# Patient Record
Sex: FEMALE | Race: WHITE | Employment: UNEMPLOYED | URBAN - METROPOLITAN AREA
[De-identification: names, ages, dates, MRNs, and addresses within clinical notes are randomized per-mention and may not be internally consistent; named-entity substitution may affect disease eponyms.]

---

## 2020-06-18 ENCOUNTER — APPOINTMENT (OUTPATIENT)
Dept: NUCLEAR MEDICINE | Age: 80
DRG: 381 | End: 2020-06-18
Attending: EMERGENCY MEDICINE
Payer: MEDICARE

## 2020-06-18 ENCOUNTER — APPOINTMENT (OUTPATIENT)
Dept: CT IMAGING | Age: 80
DRG: 381 | End: 2020-06-18
Attending: EMERGENCY MEDICINE
Payer: MEDICARE

## 2020-06-18 ENCOUNTER — HOSPITAL ENCOUNTER (INPATIENT)
Age: 80
LOS: 2 days | Discharge: HOME | DRG: 381 | End: 2020-06-20
Attending: EMERGENCY MEDICINE | Admitting: FAMILY MEDICINE
Payer: MEDICARE

## 2020-06-18 DIAGNOSIS — K92.2 LOWER GI BLEED: Primary | ICD-10-CM

## 2020-06-18 LAB
ABO + RH BLD: NORMAL
ALBUMIN SERPL-MCNC: 3.2 G/DL (ref 3.4–5)
ALBUMIN/GLOB SERPL: 1 {RATIO} (ref 0.8–1.7)
ALP SERPL-CCNC: 74 U/L (ref 45–117)
ALT SERPL-CCNC: 24 U/L (ref 13–56)
AMORPH CRY URNS QL MICRO: ABNORMAL
ANION GAP SERPL CALC-SCNC: 10 MMOL/L (ref 3–18)
APPEARANCE UR: ABNORMAL
AST SERPL-CCNC: 24 U/L (ref 10–38)
BACTERIA URNS QL MICRO: ABNORMAL /HPF
BASOPHILS # BLD: 0 K/UL (ref 0–0.1)
BASOPHILS NFR BLD: 0 % (ref 0–2)
BILIRUB SERPL-MCNC: 0.4 MG/DL (ref 0.2–1)
BILIRUB UR QL: NEGATIVE
BLOOD GROUP ANTIBODIES SERPL: NORMAL
BUN SERPL-MCNC: 19 MG/DL (ref 7–18)
BUN/CREAT SERPL: 23 (ref 12–20)
CALCIUM SERPL-MCNC: 8.7 MG/DL (ref 8.5–10.1)
CHLORIDE SERPL-SCNC: 107 MMOL/L (ref 100–111)
CO2 SERPL-SCNC: 23 MMOL/L (ref 21–32)
COLOR UR: YELLOW
CREAT SERPL-MCNC: 0.83 MG/DL (ref 0.6–1.3)
DIFFERENTIAL METHOD BLD: ABNORMAL
EOSINOPHIL # BLD: 0.2 K/UL (ref 0–0.4)
EOSINOPHIL NFR BLD: 2 % (ref 0–5)
EPITH CASTS URNS QL MICRO: ABNORMAL /LPF (ref 0–5)
ERYTHROCYTE [DISTWIDTH] IN BLOOD BY AUTOMATED COUNT: 13.1 % (ref 11.6–14.5)
ERYTHROCYTE [DISTWIDTH] IN BLOOD BY AUTOMATED COUNT: 13.2 % (ref 11.6–14.5)
GLOBULIN SER CALC-MCNC: 3.2 G/DL (ref 2–4)
GLUCOSE SERPL-MCNC: 149 MG/DL (ref 74–99)
GLUCOSE UR STRIP.AUTO-MCNC: NEGATIVE MG/DL
HCT VFR BLD AUTO: 30.9 % (ref 35–45)
HCT VFR BLD AUTO: 32.3 % (ref 35–45)
HGB BLD-MCNC: 10.2 G/DL (ref 12–16)
HGB BLD-MCNC: 10.3 G/DL (ref 12–16)
HGB UR QL STRIP: ABNORMAL
KETONES UR QL STRIP.AUTO: NEGATIVE MG/DL
LEUKOCYTE ESTERASE UR QL STRIP.AUTO: NEGATIVE
LIPASE SERPL-CCNC: 258 U/L (ref 73–393)
LYMPHOCYTES # BLD: 3.5 K/UL (ref 0.9–3.6)
LYMPHOCYTES NFR BLD: 49 % (ref 21–52)
MAGNESIUM SERPL-MCNC: 2.2 MG/DL (ref 1.6–2.6)
MCH RBC QN AUTO: 27.8 PG (ref 24–34)
MCH RBC QN AUTO: 28.6 PG (ref 24–34)
MCHC RBC AUTO-ENTMCNC: 31.9 G/DL (ref 31–37)
MCHC RBC AUTO-ENTMCNC: 33 G/DL (ref 31–37)
MCV RBC AUTO: 86.6 FL (ref 74–97)
MCV RBC AUTO: 87.1 FL (ref 74–97)
MONOCYTES # BLD: 0.4 K/UL (ref 0.05–1.2)
MONOCYTES NFR BLD: 6 % (ref 3–10)
NEUTS SEG # BLD: 3.1 K/UL (ref 1.8–8)
NEUTS SEG NFR BLD: 43 % (ref 40–73)
NITRITE UR QL STRIP.AUTO: NEGATIVE
PH UR STRIP: >8.5 [PH] (ref 5–8)
PLATELET # BLD AUTO: 158 K/UL (ref 135–420)
PLATELET # BLD AUTO: 239 K/UL (ref 135–420)
PMV BLD AUTO: 10.3 FL (ref 9.2–11.8)
PMV BLD AUTO: 9.3 FL (ref 9.2–11.8)
POTASSIUM SERPL-SCNC: 4 MMOL/L (ref 3.5–5.5)
PROT SERPL-MCNC: 6.4 G/DL (ref 6.4–8.2)
PROT UR STRIP-MCNC: NEGATIVE MG/DL
RBC # BLD AUTO: 3.57 M/UL (ref 4.2–5.3)
RBC # BLD AUTO: 3.71 M/UL (ref 4.2–5.3)
RBC #/AREA URNS HPF: ABNORMAL /HPF (ref 0–5)
RETICS/RBC NFR AUTO: 1.8 % (ref 0.5–2.3)
SODIUM SERPL-SCNC: 140 MMOL/L (ref 136–145)
SP GR UR REFRACTOMETRY: 1.02 (ref 1–1.03)
SPECIMEN EXP DATE BLD: NORMAL
UROBILINOGEN UR QL STRIP.AUTO: 0.2 EU/DL (ref 0.2–1)
WBC # BLD AUTO: 7.2 K/UL (ref 4.6–13.2)
WBC # BLD AUTO: 8.9 K/UL (ref 4.6–13.2)
WBC URNS QL MICRO: ABNORMAL /HPF (ref 0–5)

## 2020-06-18 PROCEDURE — 81001 URINALYSIS AUTO W/SCOPE: CPT

## 2020-06-18 PROCEDURE — 74011636320 HC RX REV CODE- 636/320: Performed by: EMERGENCY MEDICINE

## 2020-06-18 PROCEDURE — 65270000029 HC RM PRIVATE

## 2020-06-18 PROCEDURE — C9113 INJ PANTOPRAZOLE SODIUM, VIA: HCPCS | Performed by: FAMILY MEDICINE

## 2020-06-18 PROCEDURE — 74011000250 HC RX REV CODE- 250: Performed by: FAMILY MEDICINE

## 2020-06-18 PROCEDURE — 74011250636 HC RX REV CODE- 250/636: Performed by: EMERGENCY MEDICINE

## 2020-06-18 PROCEDURE — 85025 COMPLETE CBC W/AUTO DIFF WBC: CPT

## 2020-06-18 PROCEDURE — 85045 AUTOMATED RETICULOCYTE COUNT: CPT

## 2020-06-18 PROCEDURE — 86900 BLOOD TYPING SEROLOGIC ABO: CPT

## 2020-06-18 PROCEDURE — 74174 CTA ABD&PLVS W/CONTRAST: CPT

## 2020-06-18 PROCEDURE — 80053 COMPREHEN METABOLIC PANEL: CPT

## 2020-06-18 PROCEDURE — 74011250636 HC RX REV CODE- 250/636: Performed by: FAMILY MEDICINE

## 2020-06-18 PROCEDURE — 99285 EMERGENCY DEPT VISIT HI MDM: CPT

## 2020-06-18 PROCEDURE — 85027 COMPLETE CBC AUTOMATED: CPT

## 2020-06-18 PROCEDURE — 96374 THER/PROPH/DIAG INJ IV PUSH: CPT

## 2020-06-18 PROCEDURE — 86677 HELICOBACTER PYLORI ANTIBODY: CPT

## 2020-06-18 PROCEDURE — 83735 ASSAY OF MAGNESIUM: CPT

## 2020-06-18 PROCEDURE — 83690 ASSAY OF LIPASE: CPT

## 2020-06-18 RX ORDER — FLUMAZENIL 0.1 MG/ML
0.2 INJECTION INTRAVENOUS
Status: CANCELLED | OUTPATIENT
Start: 2020-06-18 | End: 2020-06-18

## 2020-06-18 RX ORDER — SODIUM CHLORIDE 0.9 % (FLUSH) 0.9 %
5-40 SYRINGE (ML) INJECTION EVERY 8 HOURS
Status: CANCELLED | OUTPATIENT
Start: 2020-06-18

## 2020-06-18 RX ORDER — MELATONIN
2000 DAILY
COMMUNITY

## 2020-06-18 RX ORDER — ATROPINE SULFATE 0.1 MG/ML
0.5 INJECTION INTRAVENOUS
Status: CANCELLED | OUTPATIENT
Start: 2020-06-18 | End: 2020-06-19

## 2020-06-18 RX ORDER — SODIUM CHLORIDE 0.9 % (FLUSH) 0.9 %
5-40 SYRINGE (ML) INJECTION AS NEEDED
Status: CANCELLED | OUTPATIENT
Start: 2020-06-18

## 2020-06-18 RX ORDER — DEXTROMETHORPHAN/PSEUDOEPHED 2.5-7.5/.8
1.2 DROPS ORAL
Status: CANCELLED | OUTPATIENT
Start: 2020-06-18

## 2020-06-18 RX ORDER — CALCIUM CARBONATE 200(500)MG
2 TABLET,CHEWABLE ORAL AS NEEDED
COMMUNITY

## 2020-06-18 RX ORDER — SODIUM CHLORIDE 0.9 % (FLUSH) 0.9 %
5-40 SYRINGE (ML) INJECTION AS NEEDED
Status: DISCONTINUED | OUTPATIENT
Start: 2020-06-18 | End: 2020-06-20 | Stop reason: HOSPADM

## 2020-06-18 RX ORDER — IBUPROFEN 200 MG
400 TABLET ORAL
COMMUNITY
End: 2020-06-20

## 2020-06-18 RX ORDER — ONDANSETRON 2 MG/ML
4 INJECTION INTRAMUSCULAR; INTRAVENOUS
Status: DISCONTINUED | OUTPATIENT
Start: 2020-06-18 | End: 2020-06-20 | Stop reason: HOSPADM

## 2020-06-18 RX ORDER — HEPARIN SODIUM (PORCINE) LOCK FLUSH IV SOLN 100 UNIT/ML 100 UNIT/ML
10 SOLUTION INTRAVENOUS
Status: COMPLETED | OUTPATIENT
Start: 2020-06-18 | End: 2020-06-18

## 2020-06-18 RX ORDER — EPINEPHRINE 0.1 MG/ML
1 INJECTION INTRACARDIAC; INTRAVENOUS
Status: CANCELLED | OUTPATIENT
Start: 2020-06-18 | End: 2020-06-19

## 2020-06-18 RX ORDER — ONDANSETRON 2 MG/ML
4 INJECTION INTRAMUSCULAR; INTRAVENOUS
Status: COMPLETED | OUTPATIENT
Start: 2020-06-18 | End: 2020-06-18

## 2020-06-18 RX ORDER — SODIUM CHLORIDE 9 MG/ML
1000 INJECTION, SOLUTION INTRAVENOUS CONTINUOUS
Status: CANCELLED | OUTPATIENT
Start: 2020-06-18

## 2020-06-18 RX ORDER — LANOLIN ALCOHOL/MO/W.PET/CERES
400 CREAM (GRAM) TOPICAL 2 TIMES WEEKLY
COMMUNITY

## 2020-06-18 RX ORDER — DIPHENHYDRAMINE HYDROCHLORIDE 50 MG/ML
50 INJECTION, SOLUTION INTRAMUSCULAR; INTRAVENOUS ONCE
Status: CANCELLED | OUTPATIENT
Start: 2020-06-18 | End: 2020-06-18

## 2020-06-18 RX ORDER — SODIUM CHLORIDE 0.9 % (FLUSH) 0.9 %
5-40 SYRINGE (ML) INJECTION EVERY 8 HOURS
Status: DISCONTINUED | OUTPATIENT
Start: 2020-06-18 | End: 2020-06-20 | Stop reason: HOSPADM

## 2020-06-18 RX ADMIN — HEPARIN SODIUM (PORCINE) LOCK FLUSH IV SOLN 100 UNIT/ML 10 UNITS: 100 SOLUTION at 14:15

## 2020-06-18 RX ADMIN — ONDANSETRON 4 MG: 2 INJECTION INTRAMUSCULAR; INTRAVENOUS at 08:09

## 2020-06-18 RX ADMIN — Medication 5 ML: at 22:00

## 2020-06-18 RX ADMIN — SODIUM CHLORIDE 1000 ML: 900 INJECTION, SOLUTION INTRAVENOUS at 08:09

## 2020-06-18 RX ADMIN — IOPAMIDOL 100 ML: 755 INJECTION, SOLUTION INTRAVENOUS at 09:04

## 2020-06-18 RX ADMIN — SODIUM CHLORIDE 40 MG: 9 INJECTION, SOLUTION INTRAMUSCULAR; INTRAVENOUS; SUBCUTANEOUS at 18:04

## 2020-06-18 NOTE — H&P
History & Physical    Patient: Ceasar Donato MRN: [id number]  CSN: [id number]    YOB: 1940  Age: 70 y.o. Sex: female      DOA: 6/18/2020  Primary Care Provider:  None      Assessment/Plan   69 y/o female with h/o inguinal hernia repair documented to be done by Dr. Dmitriy Hernandez. Admitted for Lower GI Bleed. Acute Lower GI Bleed   -admit to telemetry   -presenting Hgb 10.3  -Type and Cross, transfusion not necessary yet  -Dr. Lakesha Bass consulted by ED: Nuclear Med Bleeding Scan, clear liquid diet   -rehydration fluid   -avoid AC meds   -IV Protonix     DVT Prophylaxis: SCDs   GI Prophylaxis: covered by protonix     Estimated length of stay : 2-3 days     CC: BRBPR        HPI:     Ceasar Donato is a 70 y.o. female who Ceasar Donato is a 70 y.o. female with PMHX of inguinal hernia repair by Dr. Dmitriy Hernandez who presents to the emergency department C/O diarrhea.     Patient says she has been in the state of her normal health however woke up this morning with crampy abdominal pain and had episode of diarrhea. She was feeling lightheaded and faint but did not pass out. She called EMS and says that she noticed a bright red spot on the bedding when she was transferred to the stretcher. She denies abdominal pain. No sick contacts. No past medical history on file.     Past Surgical History:   Procedure Laterality Date    HX GI         Family history:     Heart Disease Brother       Arthritis-rheumatoid Father       Heart Disease Father       Stroke Father       Lung Cancer Mother       Ovarian Cancer Mother       Cancer Sister       Diabetes Sister       Heart Attack Sister             Social History     Socioeconomic History    Marital status:      Spouse name: Not on file    Number of children: Not on file    Years of education: Not on file    Highest education level: Not on file   Tobacco Use    Smoking status: Never Smoker   Substance and Sexual Activity    Alcohol use: Never Frequency: Never    Drug use: Never    Sexual activity: Never       Prior to Admission medications    Not on File       No Known Allergies    Review of Systems  Gen: No fever, chills, malaise, weight loss/gain. Heent: No headache, rhinorrhea, epistaxis, ear pain, hearing loss, sinus pain, neck pain/stiffness, sore throat. Heart: No chest pain, palpitations, BORDEN, pnd, or orthopnea. Resp: No cough, hemoptysis, wheezing and shortness of breath. GI: No nausea, vomiting, diarrhea, constipation, +BRBPR   : No urinary obstruction, dysuria or hematuria. Derm: No rash, new skin lesion or pruritis. Musc/skeletal: no bone or joint complains. Vasc: No edema, cyanosis or claudication. Endo: No heat/cold intolerance, no polyuria,polydipsia or polyphagia. Neuro: No unilateral weakness, numbness, tingling. No seizures. Heme: No easy bruising or bleeding. Physical Exam:     Physical Exam:  Visit Vitals  /59   Pulse 68   Temp 97.6 °F (36.4 °C)   Resp 22   Ht 5' 5\" (1.651 m)   Wt 74.8 kg (165 lb)   SpO2 100%   BMI 27.46 kg/m²      O2 Device: Room air    Temp (24hrs), Av.6 °F (36.4 °C), Min:97.6 °F (36.4 °C), Max:97.6 °F (36.4 °C)     0701 -  1900  In: 1000 [I.V.:1000]  Out: -    No intake/output data recorded. General:  Awake, cooperative, no distress. Head:  Normocephalic, without obvious abnormality, atraumatic. Eyes:  Conjunctivae/corneas clear, sclera anicteric, PERRL, EOMs intact. Nose: Nares normal. No drainage or sinus tenderness. Throat: Lips, mucosa, and tongue normal.    Neck: Supple, symmetrical, trachea midline, no adenopathy. Lungs:   Clear to auscultation bilaterally. Heart:  Regular rate and rhythm, S1, S2 normal, no murmur, click, rub or gallop. Abdomen: Soft, non-tender. Bowel sounds normal. No masses,  No organomegaly. Extremities: Extremities normal, atraumatic, no cyanosis or edema.  Capillary refill normal.   Pulses: 2+ and symmetric all extremities. Skin: Skin color pink, turgor normal. No rashes or lesions   Neurologic: CNII-XII intact. No focal motor or sensory deficit. Labs Reviewed:  Recent Results (from the past 24 hour(s))   CBC WITH AUTOMATED DIFF    Collection Time: 06/18/20  7:50 AM   Result Value Ref Range    WBC 7.2 4.6 - 13.2 K/uL    RBC 3.71 (L) 4.20 - 5.30 M/uL    HGB 10.3 (L) 12.0 - 16.0 g/dL    HCT 32.3 (L) 35.0 - 45.0 %    MCV 87.1 74.0 - 97.0 FL    MCH 27.8 24.0 - 34.0 PG    MCHC 31.9 31.0 - 37.0 g/dL    RDW 13.1 11.6 - 14.5 %    PLATELET 384 922 - 651 K/uL    MPV 9.3 9.2 - 11.8 FL    NEUTROPHILS 43 40 - 73 %    LYMPHOCYTES 49 21 - 52 %    MONOCYTES 6 3 - 10 %    EOSINOPHILS 2 0 - 5 %    BASOPHILS 0 0 - 2 %    ABS. NEUTROPHILS 3.1 1.8 - 8.0 K/UL    ABS. LYMPHOCYTES 3.5 0.9 - 3.6 K/UL    ABS. MONOCYTES 0.4 0.05 - 1.2 K/UL    ABS. EOSINOPHILS 0.2 0.0 - 0.4 K/UL    ABS. BASOPHILS 0.0 0.0 - 0.1 K/UL    DF AUTOMATED     METABOLIC PANEL, COMPREHENSIVE    Collection Time: 06/18/20  7:50 AM   Result Value Ref Range    Sodium 140 136 - 145 mmol/L    Potassium 4.0 3.5 - 5.5 mmol/L    Chloride 107 100 - 111 mmol/L    CO2 23 21 - 32 mmol/L    Anion gap 10 3.0 - 18 mmol/L    Glucose 149 (H) 74 - 99 mg/dL    BUN 19 (H) 7.0 - 18 MG/DL    Creatinine 0.83 0.6 - 1.3 MG/DL    BUN/Creatinine ratio 23 (H) 12 - 20      GFR est AA >60 >60 ml/min/1.73m2    GFR est non-AA >60 >60 ml/min/1.73m2    Calcium 8.7 8.5 - 10.1 MG/DL    Bilirubin, total 0.4 0.2 - 1.0 MG/DL    ALT (SGPT) 24 13 - 56 U/L    AST (SGOT) 24 10 - 38 U/L    Alk.  phosphatase 74 45 - 117 U/L    Protein, total 6.4 6.4 - 8.2 g/dL    Albumin 3.2 (L) 3.4 - 5.0 g/dL    Globulin 3.2 2.0 - 4.0 g/dL    A-G Ratio 1.0 0.8 - 1.7     LIPASE    Collection Time: 06/18/20  7:50 AM   Result Value Ref Range    Lipase 258 73 - 393 U/L   MAGNESIUM    Collection Time: 06/18/20  7:50 AM   Result Value Ref Range    Magnesium 2.2 1.6 - 2.6 mg/dL   TYPE & SCREEN    Collection Time: 06/18/20  7:50 AM Result Value Ref Range    Crossmatch Expiration 06/21/2020     ABO/Rh(D) A POSITIVE     Antibody screen NEG    URINALYSIS W/ RFLX MICROSCOPIC    Collection Time: 06/18/20  8:49 AM   Result Value Ref Range    Color YELLOW      Appearance TURBID      Specific gravity 1.016 1.005 - 1.030      pH (UA) >8.5 (H) 5.0 - 8.0    Protein Negative NEG mg/dL    Glucose Negative NEG mg/dL    Ketone Negative NEG mg/dL    Bilirubin Negative NEG      Blood SMALL (A) NEG      Urobilinogen 0.2 0.2 - 1.0 EU/dL    Nitrites Negative NEG      Leukocyte Esterase Negative NEG     URINE MICROSCOPIC ONLY    Collection Time: 06/18/20  8:49 AM   Result Value Ref Range    WBC NONE 0 - 5 /hpf    RBC 0 to 3 0 - 5 /hpf    Epithelial cells 1+ 0 - 5 /lpf    Bacteria 1+ (A) NEG /hpf    Amorphous Crystals 3+ (A) NEG       Procedures/imaging: see electronic medical records for all procedures/Xrays and details which were not copied into this note but were reviewed prior to creation of Plan    CC: None

## 2020-06-18 NOTE — ED TRIAGE NOTES
Patient arrives via EMS c/o uncontrolled nausea and diarrhea since this AM. Per EMS, bright red rectal bleeding. No PMH. AOx4.

## 2020-06-18 NOTE — PROGRESS NOTES
ADMISSION MEDICATION RECONCILIATION      Medication Reconciliation has been performed by pharmacist on this patient admitted through the ED today. Matt Ngo is a 70 y.o. female with the following Problems:  Assessment This Admission:   Chief complaint:   Chief Complaint   Patient presents with    Rectal Bleeding      Active Problems:    Lower GI bleed (6/18/2020)         Allergies as of 06/18/2020    (No Known Allergies)    denies  Prior to Admission Medications   Prescriptions Last Dose Informant Patient Reported? Taking?   calcium carbonate (HÉCTOR-SELTZER ANTACID PO) Unknown at Unknown time  Yes No   Sig: Take  by mouth as needed. calcium carbonate (TUMS) 200 mg calcium (500 mg) chew Unknown at Unknown time  Yes No   Sig: Take 2 Tabs by mouth as needed. Indications: indigestion, heartburn   cholecalciferol (Vitamin D3) (1000 Units /25 mcg) tablet 6/18/2020 at Unknown time  Yes Yes   Sig: Take 2,000 Units by mouth daily. ibuprofen (MOTRIN) 200 mg tablet Unknown at Unknown time  Yes No   Sig: Take 400 mg by mouth every eight (8) hours as needed for Pain.   magnesium oxide (MAG-OX) 400 mg tablet 6/17/2020 at Unknown time  Yes Yes   Sig: Take 400 mg by mouth two (2) times a week. multivitamin, tx-iron-ca-min (THERA-M w/ IRON) 9 mg iron-400 mcg tab tablet 6/18/2020 at Unknown time  Yes Yes   Sig: Take 1 Tab by mouth daily. Formulary substitution for DAILY MULTIVITAMIN  With minerals and enzymes   peg 400/hypromellose/glycerin (VISINE DRY EYE RELIEF OP) Unknown at Unknown time  Yes No   Sig: Apply  to eye as needed. Facility-Administered Medications: None          Interviewed patient in ED. This person was a Fair historian. Did patient/representative provide a written list of home medications? no  Medications are managed by: self  Added/updated outpatient pharmacy to include: [facility]     PAML was not  marked complete and did require modification.       Medication Compliance Issues and/or Medication Concerns: none        Added to PAML: all of the above    Attending physician or representative has not been contacted as either PAML modifications not deemed clinically significant; or modified medication has been reviewed with status of \"Do not order for admission\"; or admission orders have yet to be written.     Sofie Massey [facility], April Vega    Clinical Pharmacist  [phone]

## 2020-06-18 NOTE — ED NOTES
Lab notified of need for labs. Patient is a ed hold at this time. Lab and pharmacy and dietary made aware to deliver clear liquid diet to patient.

## 2020-06-18 NOTE — ED NOTES
TRANSFER - OUT REPORT:    Verbal report given to [facility]) on [facility]  being transferred to 306(unit) for routine progression of care       Report consisted of patients Situation, Background, Assessment and   Recommendations(SBAR). Information from the following report(s) ED Summary was reviewed with the receiving nurse. Lines:   Peripheral IV 06/18/20 Left Antecubital (Active)   Site Assessment Clean, dry, & intact 6/18/2020  7:51 AM   Phlebitis Assessment 0 6/18/2020  7:51 AM   Infiltration Assessment 0 6/18/2020  7:51 AM   Dressing Status Clean, dry, & intact 6/18/2020  7:51 AM   Dressing Type Transparent 6/18/2020  7:51 AM        Opportunity for questions and clarification was provided.       Patient transported with:   Registered Nurse

## 2020-06-18 NOTE — PROGRESS NOTES
Possibility of acute UGI bleeding that started at 6:30 am with presyncopal episode and nausea. Bright blood mixed with stool couldn't tell how much but later black with dark blood. CT scan medium sized internal hernia. Acute GI bleeding scan normal VS stable. Hgb remained stable at 10.2 even after hydration. She has been c/o daily dyspepsia and frequent nausea for one year treated with tums and Alkaselzer. She has been taking Ibuprofen every 3 to 4 days for headaches. I think we need to consider first UGI bleeding do tomorrow afternoon an EGD and colonoscopy. So she has to be prepped.  The Golytely could be started in this case tomorrow at 9 am. Told her not to take any NSAID's in the future

## 2020-06-19 LAB
ANION GAP SERPL CALC-SCNC: 5 MMOL/L (ref 3–18)
BASOPHILS # BLD: 0 K/UL (ref 0–0.1)
BASOPHILS NFR BLD: 0 % (ref 0–2)
BUN SERPL-MCNC: 13 MG/DL (ref 7–18)
BUN/CREAT SERPL: 23 (ref 12–20)
CALCIUM SERPL-MCNC: 8 MG/DL (ref 8.5–10.1)
CHLORIDE SERPL-SCNC: 112 MMOL/L (ref 100–111)
CO2 SERPL-SCNC: 26 MMOL/L (ref 21–32)
CREAT SERPL-MCNC: 0.57 MG/DL (ref 0.6–1.3)
DIFFERENTIAL METHOD BLD: ABNORMAL
EOSINOPHIL # BLD: 0.1 K/UL (ref 0–0.4)
EOSINOPHIL NFR BLD: 1 % (ref 0–5)
ERYTHROCYTE [DISTWIDTH] IN BLOOD BY AUTOMATED COUNT: 13.4 % (ref 11.6–14.5)
GLUCOSE SERPL-MCNC: 107 MG/DL (ref 74–99)
HCT VFR BLD AUTO: 28.4 % (ref 35–45)
HGB BLD-MCNC: 9 G/DL (ref 12–16)
IRON SATN MFR SERPL: 12 % (ref 20–50)
IRON SERPL-MCNC: 39 UG/DL (ref 50–175)
LYMPHOCYTES # BLD: 2.5 K/UL (ref 0.9–3.6)
LYMPHOCYTES NFR BLD: 35 % (ref 21–52)
MCH RBC QN AUTO: 28.2 PG (ref 24–34)
MCHC RBC AUTO-ENTMCNC: 31.7 G/DL (ref 31–37)
MCV RBC AUTO: 89 FL (ref 74–97)
MONOCYTES # BLD: 0.6 K/UL (ref 0.05–1.2)
MONOCYTES NFR BLD: 8 % (ref 3–10)
NEUTS SEG # BLD: 4 K/UL (ref 1.8–8)
NEUTS SEG NFR BLD: 56 % (ref 40–73)
PLATELET # BLD AUTO: 213 K/UL (ref 135–420)
PMV BLD AUTO: 9.1 FL (ref 9.2–11.8)
POTASSIUM SERPL-SCNC: 3.9 MMOL/L (ref 3.5–5.5)
RBC # BLD AUTO: 3.19 M/UL (ref 4.2–5.3)
SODIUM SERPL-SCNC: 143 MMOL/L (ref 136–145)
TIBC SERPL-MCNC: 325 UG/DL (ref 250–450)
WBC # BLD AUTO: 7.2 K/UL (ref 4.6–13.2)

## 2020-06-19 PROCEDURE — 88305 TISSUE EXAM BY PATHOLOGIST: CPT

## 2020-06-19 PROCEDURE — 83540 ASSAY OF IRON: CPT

## 2020-06-19 PROCEDURE — 36415 COLL VENOUS BLD VENIPUNCTURE: CPT

## 2020-06-19 PROCEDURE — 65270000029 HC RM PRIVATE

## 2020-06-19 PROCEDURE — 99153 MOD SED SAME PHYS/QHP EA: CPT | Performed by: INTERNAL MEDICINE

## 2020-06-19 PROCEDURE — 74011000250 HC RX REV CODE- 250: Performed by: INTERNAL MEDICINE

## 2020-06-19 PROCEDURE — 0DBL8ZX EXCISION OF TRANSVERSE COLON, VIA NATURAL OR ARTIFICIAL OPENING ENDOSCOPIC, DIAGNOSTIC: ICD-10-PCS | Performed by: INTERNAL MEDICINE

## 2020-06-19 PROCEDURE — 74011000250 HC RX REV CODE- 250: Performed by: FAMILY MEDICINE

## 2020-06-19 PROCEDURE — 77030013991 HC SNR POLYP ENDOSC BSC -A: Performed by: INTERNAL MEDICINE

## 2020-06-19 PROCEDURE — G0500 MOD SEDAT ENDO SERVICE >5YRS: HCPCS | Performed by: INTERNAL MEDICINE

## 2020-06-19 PROCEDURE — 85025 COMPLETE CBC W/AUTO DIFF WBC: CPT

## 2020-06-19 PROCEDURE — 74011250636 HC RX REV CODE- 250/636: Performed by: FAMILY MEDICINE

## 2020-06-19 PROCEDURE — 74011250636 HC RX REV CODE- 250/636: Performed by: INTERNAL MEDICINE

## 2020-06-19 PROCEDURE — 0DBH8ZX EXCISION OF CECUM, VIA NATURAL OR ARTIFICIAL OPENING ENDOSCOPIC, DIAGNOSTIC: ICD-10-PCS | Performed by: INTERNAL MEDICINE

## 2020-06-19 PROCEDURE — 77030040361 HC SLV COMPR DVT MDII -B: Performed by: INTERNAL MEDICINE

## 2020-06-19 PROCEDURE — 76040000009: Performed by: INTERNAL MEDICINE

## 2020-06-19 PROCEDURE — 0DJ08ZZ INSPECTION OF UPPER INTESTINAL TRACT, VIA NATURAL OR ARTIFICIAL OPENING ENDOSCOPIC: ICD-10-PCS | Performed by: INTERNAL MEDICINE

## 2020-06-19 PROCEDURE — 80048 BASIC METABOLIC PNL TOTAL CA: CPT

## 2020-06-19 PROCEDURE — C9113 INJ PANTOPRAZOLE SODIUM, VIA: HCPCS | Performed by: FAMILY MEDICINE

## 2020-06-19 RX ORDER — SODIUM CHLORIDE 9 MG/ML
1000 INJECTION, SOLUTION INTRAVENOUS CONTINUOUS
Status: DISPENSED | OUTPATIENT
Start: 2020-06-19 | End: 2020-06-19

## 2020-06-19 RX ORDER — NALOXONE HYDROCHLORIDE 0.4 MG/ML
0.4 INJECTION, SOLUTION INTRAMUSCULAR; INTRAVENOUS; SUBCUTANEOUS
Status: DISCONTINUED | OUTPATIENT
Start: 2020-06-19 | End: 2020-06-19 | Stop reason: HOSPADM

## 2020-06-19 RX ORDER — MIDAZOLAM HYDROCHLORIDE 1 MG/ML
.25-5 INJECTION, SOLUTION INTRAMUSCULAR; INTRAVENOUS
Status: DISCONTINUED | OUTPATIENT
Start: 2020-06-19 | End: 2020-06-19 | Stop reason: HOSPADM

## 2020-06-19 RX ORDER — FENTANYL CITRATE 50 UG/ML
100 INJECTION, SOLUTION INTRAMUSCULAR; INTRAVENOUS
Status: DISCONTINUED | OUTPATIENT
Start: 2020-06-19 | End: 2020-06-19 | Stop reason: HOSPADM

## 2020-06-19 RX ORDER — SODIUM CHLORIDE 9 MG/ML
125 INJECTION, SOLUTION INTRAVENOUS CONTINUOUS
Status: CANCELLED | OUTPATIENT
Start: 2020-06-19

## 2020-06-19 RX ADMIN — POLYETHYLENE GLYCOL 3350, SODIUM CHLORIDE, POTASSIUM CHLORIDE, SODIUM BICARBONATE, AND SODIUM SULFATE 4000 ML: 240; 5.84; 2.98; 6.72; 22.72 POWDER, FOR SOLUTION ORAL at 09:00

## 2020-06-19 RX ADMIN — ONDANSETRON 4 MG: 2 INJECTION INTRAMUSCULAR; INTRAVENOUS at 11:23

## 2020-06-19 RX ADMIN — SODIUM CHLORIDE 40 MG: 9 INJECTION, SOLUTION INTRAMUSCULAR; INTRAVENOUS; SUBCUTANEOUS at 18:18

## 2020-06-19 RX ADMIN — SODIUM CHLORIDE 40 MG: 9 INJECTION, SOLUTION INTRAMUSCULAR; INTRAVENOUS; SUBCUTANEOUS at 07:04

## 2020-06-19 RX ADMIN — Medication 10 ML: at 21:17

## 2020-06-19 RX ADMIN — SODIUM CHLORIDE 1000 ML: 900 INJECTION, SOLUTION INTRAVENOUS at 15:30

## 2020-06-19 RX ADMIN — Medication 10 ML: at 07:11

## 2020-06-19 NOTE — CONSULTS
[id number]    Name:  Myrtle Gallegos  MR#:   [id number]  :  1940  ACCOUNT #:  [de-identified]  DATE OF SERVICE:  2020    HISTORY OF PRESENT ILLNESS:  This is a 70years old female who was brought here by ambulance around 7:53 this morning because of rectal bleeding. This patient is not a very good historian and also she is hard of hearing. She claimed that this morning she woke up around 6:30 feeling the urge to go to the bathroom, but shortly after, she felt lightheaded, dizzy, and diaphoretic. She did pass bloody stool and this happened on two other occasions in the hospital, the last one was about 3 o'clock, but this one was much smaller. She claimed that her stools were black and had some red in it. The patient claimed that she was nauseous this morning, but did not vomit. She did not have any abdominal pain. She claimed that, for the last year, she has been complaining of dyspepsia with heartburn, regurgitations, and belching. She was treating this with [place] or with antacids. She has been having intermittent dysphagia to solids and sometimes to soft foods where she had to take her time to chew well. On a few occasions, she was choking. This happened recently one time with peanuts where she felt that she was going to choke and finally it passed after drinking water. Her weight has been stable. Usually, she has one bowel movement everyday. She claimed that she had a colonoscopy about seven years ago, but she is not sure where and by whom, and she claimed that a couple of polyps were removed. She told it was done at this hospital, but I did not find any records of this and also there was nothing in the [facility]. She has no family history of colon cancer. Her mother did have lung and ovarian cancer. Her father had heart disease. She is not a diabetic.   She does not smoke or drink alcohol, does take ibuprofen for headaches and for pain in her groin, almost three to four days, last consumption was about four days ago. She has no prior history of peptic ulcer disease, never had endoscopy, but she claimed that, many years ago, she had an upper GI series that showed that she had a blockage or narrowing in her throat, but never had endoscopy. She does not take any aspirin. She never had any myocardial infarction. She did have Bell's palsy, but no history of stroke. She has a previous right inguinal hernia repair. She lives by herself, but she is quite independent. She works in the yard frequently. MEDICATIONS AT HOME:  1.  Multivitamins. 2.  Vitamin D3. 3.  Motrin 400 mg every 8 hours as needed. 4.  Magnesium oxide. 5.  Calcium carbonate. 6.  [place]. 7.  Antacids. ALLERGIES:  NO KNOWN DRUG ALLERGIES. The patient felt this morning, almost going to pass out, but did not fall or pass out. She claimed that, for the last year, she has been having shortness of breath on exertion, but no chest pain. No dysuria, hematuria, or burning sensation. No back pain or rash. When she arrived, her vital signs were normal with a pulse of 68, blood pressure 128/63, saturation 100%. Her blood test showed that she has a BUN of 19, creatinine 0.83, when they were 15 and 0.9 in 2010, this was a long time ago. LFT's were normal.  Her hemoglobin was 10.3 at 7:50 and it remained at 10.2 at 6:15 p.m. Normal MCV and MCH. Normal WBC and platelets. CT scan of the abdomen and pelvis with and without contrast did not show any active bleeding. There was left colonic diverticulosis, calcified uterine fibroids, multiple simple hepatic cysts, and moderate hiatal hernia. Acute GI bleeding scan was negative. PHYSICAL EXAMINATION:  GENERAL:  We have a 70years old  female who is very hard of hearing and not a very good historian, but she appears to be in no distress.   VITAL SIGNS:  She weighs 165 pounds, temperature 97.6, pulse 66, blood pressure 119-116/, breathing 10-18 with saturation at 99% to 100%. SKIN:  Normal.  No evidence of any rash. HEENT:  The eyes are remarkable for slightly pale conjunctivae, but the sclerae are anicteric. The pupils are equal and reactive to light. Oropharyngeal cavity; she has moist and slightly pale mucous membrane. Normal teeth. NECK:  Supple. No palpable mass. No enlarged thyroid. LUNGS:  Clear to auscultation. HEART:  Cardiac rhythm is regular. S1 and S2 normal.  No murmur. ABDOMEN:  Not distended, very soft, nontender. No mass or organomegaly. Bowel sounds are normal.  EXTREMITIES:  Unremarkable. No pedal edema. No clubbing. No tremor. NEUROLOGIC:  No focal neurological signs. She is alert and oriented. Moves all her four extremities. LABORATORY DATA:  Blood test as stated above. Her WBC, platelets, and differential are normal.  Her LFT's and lipase are normal.    ASSESSMENT:  In conclusion, this is a 70years old female who has been taking ibuprofen on a regular basis for headache and also left groin pain from previous inguinal hernia repair about 10 years ago, has been complaining for the past year of frequent and almost daily dyspepsia in the form of heartburn, regurgitation, nausea, presented with rectal bleeding that appeared to be bright blood, but according to her last bowel movement, it was black with some reddish in it. In my opinion, with her upper gastrointestinal symptoms, we need to check her stomach. I believe that she may have an upper gastrointestinal bleeding, especially that this morning, she felt very orthostatic and almost had a presyncopal episode. However, her vital signs and her hemoglobin held very well at 10.2 even after hydration. We would request Helicobacter serology. She is already on Protonix, we are going to continue it and tomorrow afternoon, I will come to do her upper endoscopy after my clinic elsewhere.   For now, the patient would be allowed to have a clear liquid diet. We may consider doing her colonoscopy as an outpatient. According to the CT scan, she has a medium-sized hiatal hernia. I would not be surprised that she has erosive esophagitis. She does have some dysphagia to solids, which will be addressed during the endoscopy. Most likely, she will need to be on long-term proton-pump inhibitor. She is very hard of hearing. She does have a history of polyp removed about seven years ago, we need to verify her previous records, but not at this location or [facility]. Further notes will follow. Sincerely,        MD RYAN Gold/[email or web address]  D:  06/18/2020 19:38  T:  06/19/2020 0:10  JOB #:  [id number]  CC:  MD Evangelina Mark.  Kris Sharma MD

## 2020-06-19 NOTE — PROGRESS NOTES
Reason for Admission:   Acute Lower GI Bleed                    RUR Score:    6%                 Plan for utilizing home health:  TBD         PCP: First and Last name:     Name of Practice:    Are you a current patient: Yes/No:    Approximate date of last visit:    Can you participate in a virtual visit with your PCP:                     Current Advanced Directive/Advance Care Plan: Not on file                         Transition of Care Plan:   Home with physician follow up vs [facility] and physician follow up                    Chart reviewed. Per H&P \"Chen Miramontes is a 70 y.o. female who Annrussel Barboza a 70 y. o. female with PMHX of inguinal hernia repair by Dr. Kajal Head presents to the emergency department C/O diarrhea.     Patient says she has been in the state of her normal health however woke up this morning with crampy abdominal pain and had episode of diarrhea.  She was feeling lightheaded and faint but did not pass out.  She called EMS and says that she noticed a bright red spot on the bedding when she was transferred to the stretcher.  She denies abdominal pain.  No sick contacts. \"    1400:  CM attempted to meet with pt at bedside x2 and attempted to call x 3. Pt with planned EGD and colonoscopy. Please encourage ambulation or order therapy services to assist with identifying potential transition of care needs.   Anticipate pt will transition home with physician follow up vs [facility] and physician follow up  CM to continue to follow and assist.    Care Management Interventions  Transition of Care Consult (CM Consult): Discharge Planning  Health Maintenance Reviewed: Yes  Current Support Network: Family [employer] [place]

## 2020-06-19 NOTE — PROGRESS NOTES
1951-Bedside and Verbal shift change report given to Mani Brown (oncoming nurse) by Mercedes Montaño (offgoing nurse). Report included the following information SBAR, Kardex, Intake/Output and MAR. Patient A/OX4. Patient in bed resting quietly. 1949-Bedside and Verbal shift change report given to Yodit Chao (oncoming nurse) by Mani Brown (offgoing nurse). Report included the following information SBAR, Kardex, Intake/Output and MAR.

## 2020-06-19 NOTE — PROCEDURES
(EGD) Esophagogastroduodenoscopy (UPPER ENDOSCOPY) Procedure Note  [facility]  __________________________________________________________________________________________________________________________      6/19/2020     Patient: Conner Lipscomb YOB: 1940 Gender: female Age: 70 y.o. INDICATION:  Acute UGI bleeding that started at 6:30 am with bloody diarrhea with presyncopal episode and nausea. Bright blood mixed with stool couldn't tell how much but later black with dark blood. CT scan medium sized internal hernia. Acute GI bleeding scan normal VS stable. Hgb remained stable at 10.2 but later Hgb dropped to 9. BUN 19 creatinine . 83. Iron saturation 12. Retic count 1.8 after hydration. She has been c/o daily dyspepsia and frequent nausea for one year treated with tums and Alkaselzer. She has been taking Ibuprofen every 3 to 4 days for headaches. She has one to 2 bm/ day. : Micah Thomas MD    Referring Provider:  Felipa Griffin MD    Sedation:  Versed 5 mg IV, Fentanyl 100 mcg IV  Procedure Details:  After infomed consent was obtained for the procedure, with all risks and benefits of procedure explained to the family the patient was taken to the endoscopy suite and placed in the left lateral decubitus position. Following sequential administration of sedation as per above, the endoscope was inserted into the mouth and advanced under direct vision to third portion of the duodenum. A careful inspection was made as the gastroscope was withdrawn, including a retroflexed view of the proximal stomach; findings and interventions are described below. OROPHARYNX: The vocal Cords and the larynx could not seen. pyriform recesses normal.   ESOPHAGUS: The proximal oesophagus is normal. Grade C erosive esophagitis with irregular linear strips of erosions going up to 31 cm maximum. Mild distal esophageal stenosis from moderate scarring.  Irregular Z line with large hiatal hernia of 5 cm. Large diaphragmatic hiatus located at 38 cm. STOMACH: The fundus on antegrade and retroflex views is normal.  3 antral punched out NSAID's induced small ulcers in the antrum the smallest one 4 mm on the posterior wall and two successive on the opposite anterior wall of the antrum. No evidence of any stigmata or blood in the stomach. The cardia, body, lesser curvature, greater curvature, and pylorus are normal.   DUODENUM: The bulb, second, third portions and major papilla are normal.  Therapies:  Nil    Specimen: none           Complications:   None    EBL:  Nil. IMPRESSION: Grade C erosive esophagitis with irregular linear strips of erosions going up to 31 cm maximum. Mild distal esophageal stenosis from moderate scarring. Irregular Z line with large hiatal hernia of 5 cm. Large diaphragmatic hiatus located at 38 cm. 3 antral punched out NSAID's induced small ulcers in the antrum the smallest one 4 mm on the posterior wall and two successive on the opposite anterior wall of the antrum. No evidence of any stigmata or blood in the stomach. The mucosa appeared normal. No biopsy taken. RECOMMENDATION:  may resume antireflux diet chew well food and avoid dense meat. Avoid all ASA and NSAID's.don't eat for three hours before reclining to bed. Make a FU appointment at the office.  continue taking Pantoprazole 40 mg daily for life and repeat EGD in 6 months      Assistant: None    --Turner Ward MD on 6/19/2020 at 4:05 PM

## 2020-06-19 NOTE — ROUTINE PROCESS
Bedside and Verbal shift change report given to Meng Gambino RN (oncoming nurse) by Kezia Fernandes RN (offgoing nurse). Report included the following information SBAR and Kardex.

## 2020-06-19 NOTE — PROGRESS NOTES
Hospitalist Progress Note    Patient: Soha Burns MRN: [id number]  CSN: [id number]    YOB: 1940  Age: 70 y.o. Sex: female    DOA: 6/18/2020 LOS:  LOS: 1 day          Chief Complaint:    BRBPR    Assessment/Plan   71 y/o female with h/o inguinal hernia repair. Admitted for Lower GI Bleed.      Acute Lower GI Bleed   - presenting Hgb 10.3 >decrease to 9.0  - Dr. Dedra Rice consulted, appreciate his expertise >EGD and Colonoscopy planned   - Nuclear Med Bleeding Scan -negative   - clear liquid diet, rehydration fluid, avoid AC meds, IV Protonix      DVT Prophylaxis: SCDs   GI Prophylaxis: covered by protonix      Estimated length of stay : 2-3 days        Disposition :  Patient Active Problem List   Diagnosis Code    Lower GI bleed K92.2    Acute lower GI bleeding K92.2       Subjective:    Currently drinking bowel prep for EGD and colonoscopy. Has no complaints other than that the prep is nasty. Review of systems:    Constitutional: denies fevers, chills, myalgias  Respiratory: denies SOB, cough  Cardiovascular: denies chest pain, palpitations  Gastrointestinal: denies nausea, vomiting, diarrhea      Vital signs/Intake and Output:  Visit Vitals  /53 (BP 1 Location: Right arm, BP Patient Position: At rest)   Pulse 70   Temp 98.4 °F (36.9 °C)   Resp 16   Ht 5' 5\" (1.651 m)   Wt 74.8 kg (165 lb)   SpO2 98%   BMI 27.46 kg/m²     Current Shift:  No intake/output data recorded.   Last three shifts:  06/17 1901 - 06/19 0700  In: 1000 [I.V.:1000]  Out: -     Exam:    General: Well developed, older  female, alert, NAD, OX3  Head/Neck: NCAT, supple, No masses, No lymphadenopathy  CVS:Regular rate and rhythm, no M/R/G, S1/S2 heard, no thrill  Lungs:Clear to auscultation bilaterally, no wheezes, rhonchi, or rales  Abdomen: Soft, Nontender, No distention, Normal Bowel sounds, No hepatomegaly  Extremities: No C/C/E, pulses palpable 2+  Skin:normal texture and turgor, no rashes, no lesions  Neuro:grossly normal , follows commands  Psych:appropriate                Labs: Results:       Chemistry Recent Labs     06/19/20  0055 06/18/20  0750   * 149*    140   K 3.9 4.0   * 107   CO2 26 23   BUN 13 19*   CREA 0.57* 0.83   CA 8.0* 8.7   AGAP 5 10   BUCR 23* 23*   AP  --  74   TP  --  6.4   ALB  --  3.2*   GLOB  --  3.2   AGRAT  --  1.0      CBC w/Diff Recent Labs     06/19/20  0055 06/18/20  1814 06/18/20  0750   WBC 7.2 8.9 7.2   RBC 3.19* 3.57* 3.71*   HGB 9.0* 10.2* 10.3*   HCT 28.4* 30.9* 32.3*    158 239   GRANS 56  --  43   LYMPH 35  --  49   EOS 1  --  2      Cardiac Enzymes No results for input(s): CPK, CKND1, OMI in the last 72 hours. No lab exists for component: CKRMB, TROIP   Coagulation No results for input(s): PTP, INR, APTT, INREXT in the last 72 hours. Lipid Panel No results found for: CHOL, CHOLPOCT, CHOLX, CHLST, CHOLV, 382704, HDL, HDLP, LDL, LDLC, DLDLP, 380694, VLDLC, VLDL, TGLX, TRIGL, TRIGP, TGLPOCT, CHHD, CHHDX   BNP No results for input(s): BNPP in the last 72 hours.    Liver Enzymes Recent Labs     06/18/20  0750   TP 6.4   ALB 3.2*   AP 74      Thyroid Studies Lab Results   Component Value Date/Time    TSH 2.25 02/14/2010 01:18 PM        Procedures/imaging: see electronic medical records for all procedures/Xrays and details which were not copied into this note but were reviewed prior to creation of Destiny Jeronimo MD

## 2020-06-19 NOTE — PROGRESS NOTES
2339-Resting quietly in bed, eyes closed, arouses easily. Stable. Conferred regarding Golytely administration time, patient states that she is to stated that the doctor told her to start drinking the solutions in the morning which is what is noted in the Kardex; to start solution between 9 and 10 this morning for bowel prep.    0107-Resting quietly in bed. Stable. No complaints. IV, left antecubital, intact, with small amount of bloody drainage noted; patent, flushing without difficulty and no leaking noted. Denies pain at this time. Call light within reach. 0521-Stable. Resting in bed, arouses easily. 0704-Medicated per orders, no complaints voiced. Shift Summary:  Uneventful shift. Resting quietly in bed at shift change report.

## 2020-06-19 NOTE — ROUTINE PROCESS
Bedside and Verbal shift change report given to Georgeanne Leventhal, RN (oncoming nurse) by Dread Khna RN (offgoing nurse). Report included the followingnformation SBAR and Kardex.

## 2020-06-19 NOTE — PERIOP NOTES
TELEPHONE REPORT GIVEN TO BAKARI HOLDEN IN [address] [place] [address]. REPORTS INCLUDE PROCEDURES PERFORMED, MEDICATION GIVEN AND FINDINGS.  PT IS ALERT , COPY OF PROCEDURE FINDINGS GIVEN TO PT.

## 2020-06-19 NOTE — PROCEDURES
[facility]  Colonoscopy Procedure Report  _______________________________________________________  Patient: Abbe Major                                        Attending Physician: Gracie Muse MD    Patient ID: [id number]                                    Referring Physician: Patricia Norris MD    Exam Date: 6/19/2020      Introduction: A  70 y.o. female patient, presents for inpatient Colonoscopy    Indications: Acute UGI bleeding that started at 6:30 am with bloody diarrhea with presyncopal episode and nausea. Bright blood mixed with stool couldn't tell how much but later black with dark blood. CT scan medium sized internal hernia. Acute GI bleeding scan normal VS stable. Hgb remained stable at 10.2 but later Hgb dropped to 9. BUN 19 creatinine . 83. Iron saturation 12. Retic count 1.8 after hydration. She has been c/o daily dyspepsia and frequent nausea for one year treated with tums and Alkaselzer. She has been taking Ibuprofen every 3 to 4 days for headaches. She has one to 2 bm/ day. EGD: done now revealed grade C erosive reflux induced esophagitis with stenotic scarring. Irregular Z line. 5 cm large hiatal hernia and 3 very small NSAID's induced antral ulcers without stigmata. She had a colonoscopy many years ago. She doesn't know when and where it was done. No fx hx of colon cancer. Consent: The benefits, risks, and alternatives to the procedure were discussed and informed consent was obtained from the patient. Preparation: EKG, pulse, pulse oximetry and blood pressure were monitored throughout the procedure. ASA Classification: Class I- . The heart is an S1-S2 and regular heart rate and rhythm. Lungs are clear to auscultation and percussion. Abdomen is soft, nondistended, and nontender. Mental Status: awake, alert, and oriented to person, place, and time    Medications:  · Previously sedated for the EGD.  She received an additional.  · Versed 5 mg IV throughout the procedure. Rectal Exam: Normal Rectal Exam except for small skin tags. No Blood. Pathology Specimens:  1    Procedure: The colonoscope was passed with difficulty through the anus under direct visualization and advanced to the cecum and 4 cm inside the terminal ileum. The scope was withdrawn and the mucosa was carefully examined. The quality of the preparation was good. The views were very good. The patient's toleration of the procedure was good. The exam was done twice to the cecum. Total time is 22 minutes and withdrawal time is 15 minutes. Findings:    Rectum:   Small internal hemorrhoids. Sigmoid:   Very tortuous sigmoid with moderate sigmoid diverticulosis  Descending Colon: Moderate diverticulosis of the descending colon  Transverse Colon: Moderate diverticulosis of the transverse colon  Ascending Colon: Moderate diverticulosis of the ascending colon. <4 mm sessile polyp in the hepatic flexure cold snared  Cecum:   Very flat 6 mm serrated type polyp in the cecum behind a fold, cold snared  Terminal Ileum:   Normal      Unplanned Events: There were no unplanned events. Estimated Blood Loss: None  Impressions: Small skin tags. Small internal hemorrhoids. Very tortuous sigmoid with moderate left, transverse and ascending colon diverticulosis. <4 mm sessile polyp in the hepatic flexure cold snared. Very flat 6 mm serrated type polyp in the cecum behind a fold, cold snared. Normal Mucosa. No evidence of any blood, melena or AVM found. Complications: None; patient tolerated the procedure well. Recommendations:  · Can be Discharge home today. · Resume a high fiber and antireflux diet. · Resume own medications. Avoid all NSAID's at all time  · No need for fu Colonoscopy after this one.   · Take Miralax and/ or Colace 100 mg on regular basis if constipated    Procedure Codes:    · [address] [[id number]]    Endoscope Information:  Model Number(s)    [id number]   Assistant: None  Signed By: Irineo Lynn MD Date: 6/19/2020

## 2020-06-19 NOTE — PROGRESS NOTES
1917   Pt is awake, alert, oriented x4. Pt on clear Liquids. Pt for EGD and colonoscopy  this afternoon. 0900   Pt started on Colyte 4000ml. Lungs are clear. Abdomen soft with active BS. Pt denies pain, n or vomiting. 1119   Pt had loose BM, brownish with reddidh streak of small to moderate amt. Pt nauseated. Given Zofran 4 mg IV.   1215   Pt having loose brownish stools of large amt.   1500   Pt having yellowish liquid stools with small flakes og brownish stools. 12   Pt went to endoscopy via bed.   1725   Pt came back to floor. Pt is awake, alert, oriented x4. Lungs clear. Abdomen soft with active BS. Pain level 0/10. Placed on regular diet.

## 2020-06-19 NOTE — ROUTINE PROCESS
1753 
Bedside and Verbal shift change report given to Ariana Jorgensen RN (oncoming nurse) by I  (offgoing nurse). Report included the following information SBAR, Kardex and MAR.

## 2020-06-20 VITALS
SYSTOLIC BLOOD PRESSURE: 140 MMHG | HEART RATE: 65 BPM | HEIGHT: 65 IN | DIASTOLIC BLOOD PRESSURE: 59 MMHG | TEMPERATURE: 98.3 F | OXYGEN SATURATION: 99 % | RESPIRATION RATE: 17 BRPM | WEIGHT: 169.5 LBS | BODY MASS INDEX: 28.24 KG/M2

## 2020-06-20 LAB
ANION GAP SERPL CALC-SCNC: 5 MMOL/L (ref 3–18)
BASOPHILS # BLD: 0 K/UL (ref 0–0.1)
BASOPHILS NFR BLD: 1 % (ref 0–2)
BUN SERPL-MCNC: 10 MG/DL (ref 7–18)
BUN/CREAT SERPL: 15 (ref 12–20)
CALCIUM SERPL-MCNC: 7.9 MG/DL (ref 8.5–10.1)
CHLORIDE SERPL-SCNC: 113 MMOL/L (ref 100–111)
CO2 SERPL-SCNC: 26 MMOL/L (ref 21–32)
CREAT SERPL-MCNC: 0.66 MG/DL (ref 0.6–1.3)
DIFFERENTIAL METHOD BLD: ABNORMAL
EOSINOPHIL # BLD: 0.2 K/UL (ref 0–0.4)
EOSINOPHIL NFR BLD: 3 % (ref 0–5)
ERYTHROCYTE [DISTWIDTH] IN BLOOD BY AUTOMATED COUNT: 13.5 % (ref 11.6–14.5)
GLUCOSE SERPL-MCNC: 91 MG/DL (ref 74–99)
HCT VFR BLD AUTO: 27.2 % (ref 35–45)
HGB BLD-MCNC: 8.6 G/DL (ref 12–16)
LYMPHOCYTES # BLD: 2.1 K/UL (ref 0.9–3.6)
LYMPHOCYTES NFR BLD: 42 % (ref 21–52)
MCH RBC QN AUTO: 28.2 PG (ref 24–34)
MCHC RBC AUTO-ENTMCNC: 31.6 G/DL (ref 31–37)
MCV RBC AUTO: 89.2 FL (ref 74–97)
MONOCYTES # BLD: 0.5 K/UL (ref 0.05–1.2)
MONOCYTES NFR BLD: 9 % (ref 3–10)
NEUTS SEG # BLD: 2.3 K/UL (ref 1.8–8)
NEUTS SEG NFR BLD: 45 % (ref 40–73)
PLATELET # BLD AUTO: 184 K/UL (ref 135–420)
PMV BLD AUTO: 9.1 FL (ref 9.2–11.8)
POTASSIUM SERPL-SCNC: 4.1 MMOL/L (ref 3.5–5.5)
RBC # BLD AUTO: 3.05 M/UL (ref 4.2–5.3)
SODIUM SERPL-SCNC: 144 MMOL/L (ref 136–145)
WBC # BLD AUTO: 5.1 K/UL (ref 4.6–13.2)

## 2020-06-20 PROCEDURE — C9113 INJ PANTOPRAZOLE SODIUM, VIA: HCPCS | Performed by: FAMILY MEDICINE

## 2020-06-20 PROCEDURE — 80048 BASIC METABOLIC PNL TOTAL CA: CPT

## 2020-06-20 PROCEDURE — 74011000250 HC RX REV CODE- 250: Performed by: FAMILY MEDICINE

## 2020-06-20 PROCEDURE — 74011250636 HC RX REV CODE- 250/636: Performed by: FAMILY MEDICINE

## 2020-06-20 PROCEDURE — 85025 COMPLETE CBC W/AUTO DIFF WBC: CPT

## 2020-06-20 PROCEDURE — 36415 COLL VENOUS BLD VENIPUNCTURE: CPT

## 2020-06-20 RX ORDER — PANTOPRAZOLE SODIUM 40 MG/1
40 TABLET, DELAYED RELEASE ORAL DAILY
Qty: 30 TAB | Refills: 0 | Status: SHIPPED | OUTPATIENT
Start: 2020-06-20

## 2020-06-20 RX ADMIN — SODIUM CHLORIDE 40 MG: 9 INJECTION, SOLUTION INTRAMUSCULAR; INTRAVENOUS; SUBCUTANEOUS at 06:32

## 2020-06-20 RX ADMIN — Medication 10 ML: at 06:32

## 2020-06-20 NOTE — DISCHARGE SUMMARY
Discharge Summary    Patient: Tarsha Smith MRN: [id number]  CSN: [id number]    YOB: 1940  Age: 70 y.o. Sex: female    DOA: 6/18/2020 LOS:  LOS: 2 days   Discharge Date:      Primary Care Provider:  Surya Sr MD    Admission Diagnoses: Lower GI bleed [K92.2]  Acute lower GI bleeding [K92.2]    Discharge Diagnoses:    Problem List as of 6/20/2020 Never Reviewed          Codes Class Noted - Resolved    Lower GI bleed ICD-10-CM: K92.2  ICD-9-CM: 578.9  6/18/2020 - Present        * (Principal) Acute lower GI bleeding ICD-10-CM: K92.2  ICD-9-CM: 578.9  6/18/2020 - Present              Discharge Medications:     Current Discharge Medication List      START taking these medications    Details   pantoprazole (Protonix) 40 mg tablet Take 1 Tab by mouth daily. Qty: 30 Tab, Refills: 0         CONTINUE these medications which have NOT CHANGED    Details   multivitamin, tx-iron-ca-min (THERA-M w/ IRON) 9 mg iron-400 mcg tab tablet Take 1 Tab by mouth daily. Formulary substitution for DAILY MULTIVITAMIN  With minerals and enzymes      cholecalciferol (Vitamin D3) (1000 Units /25 mcg) tablet Take 2,000 Units by mouth daily. magnesium oxide (MAG-OX) 400 mg tablet Take 400 mg by mouth two (2) times a week. calcium carbonate (TUMS) 200 mg calcium (500 mg) chew Take 2 Tabs by mouth as needed. Indications: indigestion, heartburn      calcium carbonate (HÉCTOR-SELTZER ANTACID PO) Take  by mouth as needed. peg 400/hypromellose/glycerin (VISINE DRY EYE RELIEF OP) Apply  to eye as needed.          STOP taking these medications       ibuprofen (MOTRIN) 200 mg tablet Comments:   Reason for Stopping:               Discharge Condition: Improved     Procedures : EGD and Colonoscopy     Consults: Dr. Ac Ear  /65   Pulse 60   Temp 98.6 °F (37 °C)   Resp 16   Ht 5' 5\" (1.651 m)   Wt 76.9 kg (169 lb 8 oz)   SpO2 100%   BMI 28.21 kg/m²     General: Awake, cooperative, no acute distress    HEENT: NC, Atraumatic. PERRLA, EOMI. Anicteric sclerae. Lungs:  CTA Bilaterally. No Wheezing/Rhonchi/Rales. Heart:  Regular  rhythm,  No murmur, No Rubs, No Gallops  Abdomen: Soft, Non distended, Non tender. +Bowel sounds,   Extremities: No c/c/e  Psych:   Not anxious or agitated. Neurologic:  No acute neurological deficits. Admission HPI :    Faustina Elizabeth is a 70 y.o. female who Khari Shah a 70 y. o. female with PMHX of inguinal hernia repair by Dr. Franky Dickson presents to the emergency department C/O diarrhea.     Patient says she has been in the state of her normal health however woke up this morning with crampy abdominal pain and had episode of diarrhea.  She was feeling lightheaded and faint but did not pass out.  She called EMS and says that she noticed a bright red spot on the bedding when she was transferred to the stretcher.  She denies abdominal pain.  No sick contacts. Hospital Course :     69 y/o female with h/o inguinal hernia repair. Admitted for Lower GI Bleed.      Acute Lower GI Bleed   -EGD shows grade C erosive esophagitis 3 antral ulcers  -Resume antireflux diet  -Avoid all aspirin and NSAIDs  -Reclined in bed to sleep  -Follow-up with Dr. Winsome Newsome in 2 to 3 weeks, repeat EGD in 6 months  -Pantoprazole 40 mg daily  -s/p colonoscopy    Activity: as tolerated     Diet: GERD/Ulcer friendly diet, nurse will give to patient    Follow-up:   1. F/U with Dr. Winsome Newsome in TWO weeks   2.  EGD in SIX MONTHS   3. 2-3 weeks in PCP    Disposition: Home     Minutes spent on discharge: 35 minutes      Labs: Results:       Chemistry Recent Labs     06/20/20  0500 06/19/20  0055 06/18/20  0750   GLU 91 107* 149*    143 140   K 4.1 3.9 4.0   * 112* 107   CO2 26 26 23   BUN 10 13 19*   CREA 0.66 0.57* 0.83   CA 7.9* 8.0* 8.7   AGAP 5 5 10   BUCR 15 23* 23*   AP  --   --  74   TP  --   --  6.4   ALB  --   --  3.2* GLOB  --   --  3.2   AGRAT  --   --  1.0      CBC w/Diff Recent Labs     06/20/20  0500 06/19/20  0055 06/18/20  1814 06/18/20  0750   WBC 5.1 7.2 8.9 7.2   RBC 3.05* 3.19* 3.57* 3.71*   HGB 8.6* 9.0* 10.2* 10.3*   HCT 27.2* 28.4* 30.9* 32.3*    213 158 239   GRANS 45 56  --  43   LYMPH 42 35  --  49   EOS 3 1  --  2      Cardiac Enzymes No results for input(s): CPK, CKND1, OMI in the last 72 hours. No lab exists for component: CKRMB, TROIP   Coagulation No results for input(s): PTP, INR, APTT, INREXT in the last 72 hours. Lipid Panel No results found for: CHOL, CHOLPOCT, CHOLX, CHLST, CHOLV, 930596, HDL, HDLP, LDL, LDLC, DLDLP, 546129, VLDLC, VLDL, TGLX, TRIGL, TRIGP, TGLPOCT, CHHD, CHHDX   BNP No results for input(s): BNPP in the last 72 hours. Liver Enzymes Recent Labs     06/18/20  0750   TP 6.4   ALB 3.2*   AP 74      Thyroid Studies Lab Results   Component Value Date/Time    TSH 2.25 02/14/2010 01:18 PM            Significant Diagnostic Studies: Nm Acute Gi Bleed Scan    Result Date: 6/18/2020  NUCLEAR MEDICINE TAGGED RED BLOOD CELL IMAGING OF THE ABDOMEN FOR GASTROINTESTINAL BLEEDING  INDICATION: Rectal bleeding. Site of Injection: Left antecubital fossa DESCRIPTION: Following intravenous administration of 27.0 mCi 99mTechnetium-Ultratag labeled autologous red blood cells, dynamic imaging of the abdomen was performed for 60 minutes. The biodistribution of radiopharmaceutical appears physiologic. There is no evidence of abnormal extravasation of labeled red blood cells into bowel to indicate active gastrointestinal bleeding during the examination. IMPRESSION: No evidence of active gastrointestinal bleeding during this examination. Cta Abdomen Pelv W Cont    Result Date: 6/18/2020  EXAM: CTA ABDOMEN AND PELVIS WITH AND WITHOUT CONTRAST INDICATION: Acute rectal bleeding. COMPARISON: None.  TECHNIQUE: Axial CT imaging of the abdomen and pelvis was performed with and without intravenous contrast. Multiplanar and MIP reformats were generated. One or more dose reduction techniques were used on this CT: automated exposure control, adjustment of the mAs and/or kVp according to patient size, and iterative reconstruction techniques. The specific techniques used on this CT exam have been documented in the patient's electronic medical record. Digital Imaging and Communications in Medicine (DICOM) format image data are available to nonaffiliated external healthcare facilities or entities on a secure, media free, reciprocally searchable basis with patient authorization for at least a 12-month period after this study _______________ FINDINGS: LOWER CHEST: Lung bases clear. Moderate hiatal hernia. LIVER, BILIARY: Multiple small simple appearing hepatic cysts. No biliary dilation. PANCREAS: Normal. SPLEEN: Normal. ADRENALS: Normal. KIDNEYS/URETERS/BLADDER: Small left lower pole renal cortical cyst. Possible small upper pole right renal cyst. Urinary bladder decompressed and not well evaluated. LYMPH NODES: No enlarged lymph nodes. GASTROINTESTINAL TRACT: No bowel dilation or wall thickening. Descending and sigmoid diverticulosis without specific CT findings of diverticulitis. Normal appendix. No active GI bleeding site identified. PELVIC ORGANS: Calcified uterine fundal fibroids largest measuring about 1.7 cm. Fat-containing left inguinal hernia. No intraperitoneal fluid. VASCULATURE: No evidence of aortic aneurysm or dissection. No significant atherosclerotic disease. All branch vessels widely patent. No active bleeding site identified. BONES: No acute or aggressive osseous abnormalities identified. Multilevel thoracic and lumbar spondylosis and degenerative disc disease. Sclerotic focus right sacrum likely benign fibro-osseous lesion. Periarticular sclerosis along sacroiliac joints may represent chronic sacroiliitis. Severe L5-S1 facet arthropathy. OTHER: Small fat-containing umbilical hernia. _______________     IMPRESSION: 1. No CT evidence of acute abdominal abnormality. No active GI bleeding site identified. Normal-appearing aorta and branch vessels. 2. Left colon diverticulosis without specific CT findings of diverticulitis. 3. Calcified uterine fibroids. Fat-containing left inguinal hernia. Small fat-containing umbilical hernia. 4. Multiple simple appearing hepatic cysts. Small renal cysts. 5. Moderate hiatal hernia. Nonacute osseous findings described above. No results found for this or any previous visit.         CC: Sourav Mobley MD

## 2020-06-20 NOTE — PROGRESS NOTES
Assumed pt care from [facility] RN    Uneventful shift. Denies pain, no episode of nausea and vomiting. Visit Vitals  /65   Pulse 60   Temp 98.6 °F (37 °C)   Resp 16   Ht 5' 5\" (1.651 m)   Wt 76.9 kg (169 lb 8 oz)   SpO2 100%   BMI 28.21 kg/m²       Bedside shift change report given to Bernadette Lucero RN (oncoming nurse) by Tato Hernandez (offgoing nurse). Report included the following information SBAR, Kardex, ED Summary, Procedure Summary, Intake/Output, MAR, Recent Results, Cardiac Rhythm NSR, Alarm Parameters  and Quality Measures.

## 2020-06-20 NOTE — PROGRESS NOTES
Problem: Falls - Risk of  Goal: *Absence of Falls  Description: Document Adrian Lopez Fall Risk and appropriate interventions in the flowsheet.   Outcome: Resolved/Met  Note: Fall Risk Interventions:  Mobility Interventions: Patient to call before getting OOB              Elimination Interventions: Call light in reach              Problem: Pain  Goal: *Control of Pain  Outcome: Resolved/Met  Goal: *PALLIATIVE CARE:  Alleviation of Pain  Outcome: Resolved/Met     Problem: Patient Education: Go to Patient Education Activity  Goal: Patient/Family Education  Outcome: Resolved/Met

## 2020-06-20 NOTE — DISCHARGE INSTRUCTIONS
Patient Education        Colonoscopy: What to Expect at [address] [place] [address]  After a colonoscopy, you'll stay at the clinic for 1 to 2 hours until the medicines wear off. Then you can go home. But you'll need to arrange for a ride. Your doctor will tell you when you can eat and do your other usual activities. Your doctor will talk to you about when you'll need your next colonoscopy. Your doctor can help you decide how often you need to be checked. This will depend on the results of your test and your risk for colorectal cancer. After the test, you may be bloated or have gas pains. You may need to pass gas. If a biopsy was done or a polyp was removed, you may have streaks of blood in your stool (feces) for a few days. Problems such as heavy rectal bleeding may not occur until several weeks after the test. This isn't common. But it can happen after polyps are removed. This care sheet gives you a general idea about how long it will take for you to recover. But each person recovers at a different pace. Follow the steps below to get better as quickly as possible. How can you care for yourself at home? Activity  · Rest when you feel tired. · You can do your normal activities when it feels okay to do so. Diet  · Follow your doctor's directions for eating. · Unless your doctor has told you not to, drink plenty of fluids. This helps to replace the fluids that were lost during the colon prep. · Do not drink alcohol. Medicines  · Your doctor will tell you if and when you can restart your medicines. He or she will also give you instructions about taking any new medicines. · If you take aspirin or some other blood thinner, ask your doctor if and when to start taking it again. Make sure that you understand exactly what your doctor wants you to do. · If polyps were removed or a biopsy was done during the test, your doctor may tell you not to take aspirin or other anti-inflammatory medicines for a few days.  These include ibuprofen (Advil, Motrin) and naproxen (Aleve). Other instructions  · For your safety, do not drive or operate machinery until the medicine wears off and you can think clearly. Your doctor may tell you not to drive or operate machinery until the day after your test.  · Do not sign legal documents or make major decisions until the medicine wears off and you can think clearly. The anesthesia can make it hard for you to fully understand what you are agreeing to. Follow-up care is a key part of your treatment and safety. Be sure to make and go to all appointments, and call your doctor if you are having problems. It's also a good idea to know your test results and keep a list of the medicines you take. When should you call for help? [id number] anytime you think you may need emergency care. For example, call if:  · You passed out (lost consciousness). · You pass maroon or bloody stools. · You have trouble breathing. Call your doctor now or seek immediate medical care if:  · You have pain that does not get better after you take pain medicine. · You are sick to your stomach or cannot drink fluids. · You have new or worse belly pain. · You have blood in your stools. · You have a fever. · You cannot pass stools or gas. Watch closely for changes in your health, and be sure to contact your doctor if you have any problems. Where can you learn more? Go to http://cuong-cindy.info/  Enter E264 in the search box to learn more about \"Colonoscopy: What to Expect at Home. \"  Current as of: August 22, 2019               Content Version: 12.5  © 0206-9873 [employer]. Care instructions adapted under license by [employer] (which disclaims liability or warranty for this information).  If you have questions about a medical condition or this instruction, always ask your healthcare professional. [address] any warranty or liability for your use of this information.

## 2020-06-20 NOTE — PROGRESS NOTES
Chart reviewed as CM on call. Pt admitted to medical by hospitalist. Amandeep Velazco noted. Called and spoke with pt on phone regarding dc plan. Pt has ride home. Pt has follow up appts per AVS.  Pt confirms her PCP is MD Cortez Morales. Discussed with MD Carballo if therapy needed prior to discharge and she did not feel it necessary. Pt voices no dc concerns. CM will cont to be available as needed for any transition of care needs via hospital .

## 2020-06-20 NOTE — PROGRESS NOTES
Hospitalist Progress Note    Patient: Kati Ramirez MRN: [id number]  CSN: [id number]    YOB: 1940  Age: 70 y.o. Sex: female    DOA: 6/18/2020 LOS:  LOS: 2 days          Chief Complaint:    BRBPR    Assessment/Plan   71 y/o female with h/o inguinal hernia repair. Admitted for Lower GI Bleed.      Acute Lower GI Bleed   -EGD shows grade C erosive esophagitis 3 antral ulcers  -Resume antireflux diet  -Avoid all aspirin and NSAIDs  -Reclined in bed to sleep  -Follow-up with Dr. Viky Muñoz in 2 to 3 weeks, repeat EGD in 6 months  -Pantoprazole 40 mg daily  -s/p colonoscopy     DVT Prophylaxis: SCDs   GI Prophylaxis: covered by protonix      Estimated length of stay : 2-3 days        Disposition :  Patient Active Problem List   Diagnosis Code    Lower GI bleed K92.2    Acute lower GI bleeding K92.2       Subjective:    Ready to go home. Asked a lot of questions. All of them satisfactorily. Review of systems:    Constitutional: denies fevers, chills, myalgias  Respiratory: denies SOB, cough  Cardiovascular: denies chest pain, palpitations  Gastrointestinal: denies nausea, vomiting, diarrhea      Vital signs/Intake and Output:  Visit Vitals  /65   Pulse 60   Temp 98.6 °F (37 °C)   Resp 16   Ht 5' 5\" (1.651 m)   Wt 76.9 kg (169 lb 8 oz)   SpO2 100%   BMI 28.21 kg/m²     Current Shift:  No intake/output data recorded.   Last three shifts:  06/18 1901 - 06/20 0700  In: 240 [P.O.:240]  Out: 1000 [Urine:1000]    Exam:    General: Well developed, older  female, alert, NAD, OX3  Head/Neck: NCAT, supple, No masses, No lymphadenopathy  CVS:Regular rate and rhythm, no M/R/G, S1/S2 heard, no thrill  Lungs:Clear to auscultation bilaterally, no wheezes, rhonchi, or rales  Abdomen: Soft, Nontender, No distention, Normal Bowel sounds, No hepatomegaly  Extremities: No C/C/E, pulses palpable 2+  Skin:normal texture and turgor, no rashes, no lesions  Neuro:grossly normal , follows commands  Psych:appropriate                Labs: Results:       Chemistry Recent Labs     06/20/20  0500 06/19/20  0055 06/18/20  0750   GLU 91 107* 149*    143 140   K 4.1 3.9 4.0   * 112* 107   CO2 26 26 23   BUN 10 13 19*   CREA 0.66 0.57* 0.83   CA 7.9* 8.0* 8.7   AGAP 5 5 10   BUCR 15 23* 23*   AP  --   --  74   TP  --   --  6.4   ALB  --   --  3.2*   GLOB  --   --  3.2   AGRAT  --   --  1.0      CBC w/Diff Recent Labs     06/20/20  0500 06/19/20  0055 06/18/20  1814 06/18/20  0750   WBC 5.1 7.2 8.9 7.2   RBC 3.05* 3.19* 3.57* 3.71*   HGB 8.6* 9.0* 10.2* 10.3*   HCT 27.2* 28.4* 30.9* 32.3*    213 158 239   GRANS 45 56  --  43   LYMPH 42 35  --  49   EOS 3 1  --  2      Cardiac Enzymes No results for input(s): CPK, CKND1, OMI in the last 72 hours. No lab exists for component: CKRMB, TROIP   Coagulation No results for input(s): PTP, INR, APTT, INREXT, INREXT in the last 72 hours. Lipid Panel No results found for: CHOL, CHOLPOCT, CHOLX, CHLST, CHOLV, 801821, HDL, HDLP, LDL, LDLC, DLDLP, 155677, VLDLC, VLDL, TGLX, TRIGL, TRIGP, TGLPOCT, CHHD, CHHDX   BNP No results for input(s): BNPP in the last 72 hours.    Liver Enzymes Recent Labs     06/18/20  0750   TP 6.4   ALB 3.2*   AP 74      Thyroid Studies Lab Results   Component Value Date/Time    TSH 2.25 02/14/2010 01:18 PM        Procedures/imaging: see electronic medical records for all procedures/Xrays and details which were not copied into this note but were reviewed prior to creation of Yariel Hull MD

## 2020-06-20 NOTE — ROUTINE PROCESS
Assumed care of patient from [facility], RN at this time. 1110-MD in to see patient. 1120-Patient to discharge home.

## 2020-06-23 LAB
H PYLORI IGA SER-ACNC: 9.2 UNITS (ref 0–8.9)
H PYLORI IGG SER IA-ACNC: 0.48 INDEX VALUE (ref 0–0.79)

## 2022-03-18 PROBLEM — K92.2 LOWER GI BLEED: Status: ACTIVE | Noted: 2020-06-18

## 2022-03-19 PROBLEM — K92.2 ACUTE LOWER GI BLEEDING: Status: ACTIVE | Noted: 2020-06-18

## 2023-04-28 NOTE — ED PROVIDER NOTES
EMERGENCY DEPARTMENT HISTORY AND PHYSICAL EXAM    Date: 6/18/2020  Patient Name: Kaylee Saravia    History of Presenting Illness     Chief Complaint   Patient presents with    Rectal Bleeding         History Provided By: Patient    Yolette Ralph is a 70 y.o. female with PMHX of inguinal hernia repair by Dr. Bozena Hope who presents to the emergency department C/O diarrhea. Patient says she has been in the state of her normal health however woke up this morning with crampy abdominal pain and had episode of diarrhea. She was feeling lightheaded and faint but did not pass out. She called EMS and says that she noticed a bright red spot on the bedding when she was transferred to the stretcher. She denies abdominal pain. No sick contacts. PCP: Piedad Torres MD        Past History     Past Medical History:  No past medical history on file. Past Surgical History:  Past Surgical History:   Procedure Laterality Date    HX GI         Family History:  No family history on file. Social History:  Social History     Tobacco Use    Smoking status: Never Smoker   Substance Use Topics    Alcohol use: Never     Frequency: Never    Drug use: Never       Allergies:  No Known Allergies      Review of Systems   Review of Systems   Constitutional: Negative for chills and fever. HENT: Negative for congestion, rhinorrhea and sinus pain. Respiratory: Negative for cough and shortness of breath. Cardiovascular: Negative for chest pain and palpitations. Gastrointestinal: Positive for blood in stool, diarrhea, nausea and vomiting. Negative for abdominal distention and abdominal pain. Genitourinary: Negative for difficulty urinating and dysuria. Musculoskeletal: Negative for back pain and neck pain. Skin: Negative for color change and rash. Neurological: Positive for light-headedness. Negative for headaches. All other systems reviewed and are negative.         Physical Exam     Vitals:    06/18/20 Writer left message for patient to convey results.  Patient given call back number and name for any questions or concerns.     1600 06/18/20 1645 06/18/20 1700 06/18/20 1800   BP: 95/62 146/78 (!) 116/95 (!) 119/104   Pulse: 69 61 61 66   Resp: 17 9 10 18   Temp:       SpO2: 100% 100% 99% 100%   Weight:       Height:         Physical Exam  Vitals signs and nursing note reviewed. Constitutional:       General: She is not in acute distress. Appearance: She is well-developed. HENT:      Head: Normocephalic and atraumatic. Eyes:      Conjunctiva/sclera: Conjunctivae normal.      Pupils: Pupils are equal, round, and reactive to light. Neck:      Musculoskeletal: Normal range of motion and neck supple. Cardiovascular:      Rate and Rhythm: Normal rate and regular rhythm. Heart sounds: Normal heart sounds. Pulmonary:      Effort: Pulmonary effort is normal. No respiratory distress. Breath sounds: Normal breath sounds. No wheezing or rales. Chest:      Chest wall: No tenderness. Abdominal:      General: There is no distension. Palpations: Abdomen is soft. Tenderness: There is no abdominal tenderness. There is no guarding or rebound. Genitourinary:     Comments: Gross red blood with small clots without active bleeding, nonthrombosed external hemorrhoid   Musculoskeletal: Normal range of motion. General: No tenderness. Lymphadenopathy:      Cervical: No cervical adenopathy. Skin:     General: Skin is warm and dry. Neurological:      General: No focal deficit present. Mental Status: She is alert and oriented to person, place, and time. Cranial Nerves: No cranial nerve deficit. Motor: No abnormal muscle tone.       Coordination: Coordination normal.   Psychiatric:         Mood and Affect: Mood normal.         Behavior: Behavior normal.             Diagnostic Study Results     Labs -     Recent Results (from the past 12 hour(s))   CBC WITH AUTOMATED DIFF    Collection Time: 06/18/20  7:50 AM   Result Value Ref Range    WBC 7.2 4.6 - 13.2 K/uL    RBC 3.71 (L) 4.20 - 5.30 M/uL    HGB 10.3 (L) 12.0 - 16.0 g/dL    HCT 32.3 (L) 35.0 - 45.0 %    MCV 87.1 74.0 - 97.0 FL    MCH 27.8 24.0 - 34.0 PG    MCHC 31.9 31.0 - 37.0 g/dL    RDW 13.1 11.6 - 14.5 %    PLATELET 567 937 - 886 K/uL    MPV 9.3 9.2 - 11.8 FL    NEUTROPHILS 43 40 - 73 %    LYMPHOCYTES 49 21 - 52 %    MONOCYTES 6 3 - 10 %    EOSINOPHILS 2 0 - 5 %    BASOPHILS 0 0 - 2 %    ABS. NEUTROPHILS 3.1 1.8 - 8.0 K/UL    ABS. LYMPHOCYTES 3.5 0.9 - 3.6 K/UL    ABS. MONOCYTES 0.4 0.05 - 1.2 K/UL    ABS. EOSINOPHILS 0.2 0.0 - 0.4 K/UL    ABS. BASOPHILS 0.0 0.0 - 0.1 K/UL    DF AUTOMATED     METABOLIC PANEL, COMPREHENSIVE    Collection Time: 06/18/20  7:50 AM   Result Value Ref Range    Sodium 140 136 - 145 mmol/L    Potassium 4.0 3.5 - 5.5 mmol/L    Chloride 107 100 - 111 mmol/L    CO2 23 21 - 32 mmol/L    Anion gap 10 3.0 - 18 mmol/L    Glucose 149 (H) 74 - 99 mg/dL    BUN 19 (H) 7.0 - 18 MG/DL    Creatinine 0.83 0.6 - 1.3 MG/DL    BUN/Creatinine ratio 23 (H) 12 - 20      GFR est AA >60 >60 ml/min/1.73m2    GFR est non-AA >60 >60 ml/min/1.73m2    Calcium 8.7 8.5 - 10.1 MG/DL    Bilirubin, total 0.4 0.2 - 1.0 MG/DL    ALT (SGPT) 24 13 - 56 U/L    AST (SGOT) 24 10 - 38 U/L    Alk.  phosphatase 74 45 - 117 U/L    Protein, total 6.4 6.4 - 8.2 g/dL    Albumin 3.2 (L) 3.4 - 5.0 g/dL    Globulin 3.2 2.0 - 4.0 g/dL    A-G Ratio 1.0 0.8 - 1.7     LIPASE    Collection Time: 06/18/20  7:50 AM   Result Value Ref Range    Lipase 258 73 - 393 U/L   MAGNESIUM    Collection Time: 06/18/20  7:50 AM   Result Value Ref Range    Magnesium 2.2 1.6 - 2.6 mg/dL   TYPE & SCREEN    Collection Time: 06/18/20  7:50 AM   Result Value Ref Range    Crossmatch Expiration 06/21/2020     ABO/Rh(D) A POSITIVE     Antibody screen NEG    URINALYSIS W/ RFLX MICROSCOPIC    Collection Time: 06/18/20  8:49 AM   Result Value Ref Range    Color YELLOW      Appearance TURBID      Specific gravity 1.016 1.005 - 1.030      pH (UA) >8.5 (H) 5.0 - 8.0    Protein Negative NEG mg/dL    Glucose Negative NEG mg/dL    Ketone Negative NEG mg/dL    Bilirubin Negative NEG      Blood SMALL (A) NEG      Urobilinogen 0.2 0.2 - 1.0 EU/dL    Nitrites Negative NEG      Leukocyte Esterase Negative NEG     URINE MICROSCOPIC ONLY    Collection Time: 06/18/20  8:49 AM   Result Value Ref Range    WBC NONE 0 - 5 /hpf    RBC 0 to 3 0 - 5 /hpf    Epithelial cells 1+ 0 - 5 /lpf    Bacteria 1+ (A) NEG /hpf    Amorphous Crystals 3+ (A) NEG   CBC W/O DIFF    Collection Time: 06/18/20  6:14 PM   Result Value Ref Range    WBC 8.9 4.6 - 13.2 K/uL    RBC 3.57 (L) 4.20 - 5.30 M/uL    HGB 10.2 (L) 12.0 - 16.0 g/dL    HCT 30.9 (L) 35.0 - 45.0 %    MCV 86.6 74.0 - 97.0 FL    MCH 28.6 24.0 - 34.0 PG    MCHC 33.0 31.0 - 37.0 g/dL    RDW 13.2 11.6 - 14.5 %    PLATELET 454 262 - 867 K/uL    MPV 10.3 9.2 - 11.8 FL       Radiologic Studies -   NM ACUTE GI BLEED SCAN   Final Result   IMPRESSION:       No evidence of active gastrointestinal bleeding during this examination. CTA ABDOMEN PELV W CONT   Final Result   IMPRESSION:         1. No CT evidence of acute abdominal abnormality. No active GI bleeding site   identified. Normal-appearing aorta and branch vessels. 2. Left colon diverticulosis without specific CT findings of diverticulitis. 3. Calcified uterine fibroids. Fat-containing left inguinal hernia. Small   fat-containing umbilical hernia. 4. Multiple simple appearing hepatic cysts. Small renal cysts. 5. Moderate hiatal hernia. Nonacute osseous findings described above. CT Results  (Last 48 hours)               06/18/20 0922  CTA ABDOMEN PELV W CONT Final result    Impression:  IMPRESSION:           1. No CT evidence of acute abdominal abnormality. No active GI bleeding site   identified. Normal-appearing aorta and branch vessels. 2. Left colon diverticulosis without specific CT findings of diverticulitis. 3. Calcified uterine fibroids. Fat-containing left inguinal hernia.  Small fat-containing umbilical hernia. 4. Multiple simple appearing hepatic cysts. Small renal cysts. 5. Moderate hiatal hernia. Nonacute osseous findings described above. Narrative:  EXAM: CTA ABDOMEN AND PELVIS WITH AND WITHOUT CONTRAST       INDICATION: Acute rectal bleeding. COMPARISON: None. TECHNIQUE: Axial CT imaging of the abdomen and pelvis was performed with and   without intravenous contrast. Multiplanar and MIP reformats were generated. One   or more dose reduction techniques were used on this CT: automated exposure   control, adjustment of the mAs and/or kVp according to patient size, and   iterative reconstruction techniques. The specific techniques used on this CT   exam have been documented in the patient's electronic medical record. Digital   Imaging and Communications in Medicine (DICOM) format image data are available   to nonaffiliated external healthcare facilities or entities on a secure, media   free, reciprocally searchable basis with patient authorization for at least a   12-month period after this study       _______________       FINDINGS:       LOWER CHEST: Lung bases clear. Moderate hiatal hernia. LIVER, BILIARY: Multiple small simple appearing hepatic cysts. No biliary   dilation. PANCREAS: Normal.       SPLEEN: Normal.       ADRENALS: Normal.       KIDNEYS/URETERS/BLADDER: Small left lower pole renal cortical cyst. Possible   small upper pole right renal cyst. Urinary bladder decompressed and not well   evaluated. LYMPH NODES: No enlarged lymph nodes. GASTROINTESTINAL TRACT: No bowel dilation or wall thickening. Descending and   sigmoid diverticulosis without specific CT findings of diverticulitis. Normal   appendix. No active GI bleeding site identified. PELVIC ORGANS: Calcified uterine fundal fibroids largest measuring about 1.7 cm. Fat-containing left inguinal hernia. No intraperitoneal fluid.        VASCULATURE: No evidence of aortic aneurysm or dissection. No significant   atherosclerotic disease. All branch vessels widely patent. No active bleeding   site identified. BONES: No acute or aggressive osseous abnormalities identified. Multilevel   thoracic and lumbar spondylosis and degenerative disc disease. Sclerotic focus   right sacrum likely benign fibro-osseous lesion. Periarticular sclerosis along   sacroiliac joints may represent chronic sacroiliitis. Severe L5-S1 facet   arthropathy. OTHER: Small fat-containing umbilical hernia.       _______________               CXR Results  (Last 48 hours)    None          Medications given in the ED-  Medications   sodium chloride (NS) flush 5-40 mL (has no administration in time range)   sodium chloride (NS) flush 5-40 mL (has no administration in time range)   ondansetron (ZOFRAN) injection 4 mg (has no administration in time range)   pantoprazole (PROTONIX) 40 mg in 0.9% sodium chloride 10 mL injection (40 mg IntraVENous Given 6/18/20 1804)   sodium chloride 0.9 % bolus infusion 1,000 mL (0 mL IntraVENous IV Completed 6/18/20 1036)   ondansetron (ZOFRAN) injection 4 mg (4 mg IntraVENous Given 6/18/20 0809)   iopamidoL (ISOVUE-370) 76 % injection 100 mL (100 mL IntraVENous Given 6/18/20 0904)   technetium labeled red blood cells (ULTRATAG) injection 27 millicurie (27 millicuries IntraVENous Given 6/18/20 1415)   heparin (porcine) 100 unit/mL injection 10 Units (10 Units IntraVENous Given 6/18/20 1415)         Medical Decision Making   I am the first provider for this patient. I reviewed the vital signs, available nursing notes, past medical history, past surgical history, family history and social history. Vital Signs-Reviewed the patient's vital signs.     Pulse Oximetry Analysis - 100% on RA     Cardiac Monitor:  Rate: 54 bpm  Rhythm: sinus bradycardia        Records Reviewed: Nursing Notes    Provider Notes (Medical Decision Making): Chastity Chavez is a 70 y.o. female with lower GI bleeding. Bright red blood per exam.  Normal rectal exam.  CTA demonstrates no evidence of active bleeding suspect small diverticular bleed. I discussed with Dr. Nghia Fernández, who is requesting GI nuc med scan and clear liquid diet. Will discuss with hospitalist regarding admission. Patient hemodynamically stable. Procedures:  Procedures    ED Course:   7:05 PM  Discussed with Dr. Vita Chavez who will admit patient for lower GI bleed    Diagnosis and Disposition     Critical Care Time:     Core Measures:  For Hospitalized Patients:    1. Hospitalization Decision Time:  The decision to hospitalize the patient was made by Dr Nadia Singh on 6/18/2020    2. Aspirin: Aspirin was not given because the patient is a bleeding risk     Patient is being admitted to the hospital by Dr Irish Paulino. The results of their tests and reasons for their admission have been discussed with them and/or available family. They convey agreement and understanding for the need to be admitted and for their admission diagnosis. CONDITIONS ON ADMISSION:  Sepsis is not present at the time of admission. Deep Vein Thrombosis is not present at the time of admission. Thrombosis is not present at the time of admission. Urinary Tract Infection is not present at the time of admission. Pneumonia is not present at the time of admission. MRSA is not present at the time of admission. Wound infection is not present at the time of admission. Pressure Ulcer is not present at the time of admission. CLINICAL IMPRESSION:    1. Lower GI bleed      _______________________________      Please note that this dictation was completed with [employer], the computer voice recognition software. Quite often unanticipated grammatical, syntax, homophones, and other interpretive errors are inadvertently transcribed by the computer software. Please disregard these errors. Please excuse any errors that have escaped final proofreading.

## 2024-05-25 ENCOUNTER — APPOINTMENT (OUTPATIENT)
Facility: HOSPITAL | Age: 84
End: 2024-05-25
Payer: MEDICARE

## 2024-05-25 ENCOUNTER — HOSPITAL ENCOUNTER (EMERGENCY)
Facility: HOSPITAL | Age: 84
Discharge: HOME | End: 2024-05-25
Attending: STUDENT IN AN ORGANIZED HEALTH CARE EDUCATION/TRAINING PROGRAM
Payer: MEDICARE

## 2024-05-25 VITALS
WEIGHT: 165 LBS | HEART RATE: 73 BPM | SYSTOLIC BLOOD PRESSURE: 194 MMHG | TEMPERATURE: 97.2 F | HEIGHT: 64 IN | DIASTOLIC BLOOD PRESSURE: 78 MMHG | OXYGEN SATURATION: 100 % | BODY MASS INDEX: 28.17 KG/M2 | RESPIRATION RATE: 20 BRPM

## 2024-05-25 DIAGNOSIS — R11.2 NAUSEA AND VOMITING, UNSPECIFIED VOMITING TYPE: ICD-10-CM

## 2024-05-25 DIAGNOSIS — R10.84 GENERALIZED ABDOMINAL PAIN: Primary | ICD-10-CM

## 2024-05-25 DIAGNOSIS — R03.0 ELEVATED BLOOD PRESSURE READING: ICD-10-CM

## 2024-05-25 LAB
ALBUMIN SERPL-MCNC: 3.8 G/DL (ref 3.4–5)
ALBUMIN/GLOB SERPL: 1.1 (ref 0.8–1.7)
ALP SERPL-CCNC: 98 U/L (ref 45–117)
ALT SERPL-CCNC: 29 U/L (ref 13–56)
ANION GAP SERPL CALC-SCNC: 6 MMOL/L (ref 3–18)
APPEARANCE UR: CLEAR
AST SERPL-CCNC: 45 U/L (ref 10–38)
BASOPHILS # BLD: 0.1 K/UL (ref 0–0.1)
BASOPHILS NFR BLD: 1 % (ref 0–2)
BILIRUB SERPL-MCNC: 0.5 MG/DL (ref 0.2–1)
BILIRUB UR QL: NEGATIVE
BUN SERPL-MCNC: 15 MG/DL (ref 7–18)
BUN/CREAT SERPL: 20 (ref 12–20)
CALCIUM SERPL-MCNC: 10 MG/DL (ref 8.5–10.1)
CHLORIDE SERPL-SCNC: 108 MMOL/L (ref 100–111)
CO2 SERPL-SCNC: 23 MMOL/L (ref 21–32)
COLOR UR: YELLOW
CREAT SERPL-MCNC: 0.75 MG/DL (ref 0.6–1.3)
DIFFERENTIAL METHOD BLD: ABNORMAL
EKG ATRIAL RATE: 69 BPM
EKG DIAGNOSIS: NORMAL
EKG P AXIS: -5 DEGREES
EKG P-R INTERVAL: 154 MS
EKG Q-T INTERVAL: 414 MS
EKG QRS DURATION: 88 MS
EKG QTC CALCULATION (BAZETT): 443 MS
EKG R AXIS: -52 DEGREES
EKG T AXIS: 32 DEGREES
EKG VENTRICULAR RATE: 69 BPM
EOSINOPHIL # BLD: 0.1 K/UL (ref 0–0.4)
EOSINOPHIL NFR BLD: 1 % (ref 0–5)
ERYTHROCYTE [DISTWIDTH] IN BLOOD BY AUTOMATED COUNT: 14.6 % (ref 11.6–14.5)
GLOBULIN SER CALC-MCNC: 3.4 G/DL (ref 2–4)
GLUCOSE SERPL-MCNC: 130 MG/DL (ref 74–99)
GLUCOSE UR STRIP.AUTO-MCNC: NEGATIVE MG/DL
HCT VFR BLD AUTO: 33 % (ref 35–45)
HGB BLD-MCNC: 10.6 G/DL (ref 12–16)
HGB UR QL STRIP: NEGATIVE
IMM GRANULOCYTES # BLD AUTO: 0 K/UL (ref 0–0.04)
IMM GRANULOCYTES NFR BLD AUTO: 0 % (ref 0–0.5)
KETONES UR QL STRIP.AUTO: NEGATIVE MG/DL
LEUKOCYTE ESTERASE UR QL STRIP.AUTO: NEGATIVE
LIPASE SERPL-CCNC: 56 U/L (ref 13–75)
LYMPHOCYTES # BLD: 3.6 K/UL (ref 0.9–3.6)
LYMPHOCYTES NFR BLD: 38 % (ref 21–52)
MCH RBC QN AUTO: 26.2 PG (ref 24–34)
MCHC RBC AUTO-ENTMCNC: 32.1 G/DL (ref 31–37)
MCV RBC AUTO: 81.7 FL (ref 78–100)
MONOCYTES # BLD: 0.5 K/UL (ref 0.05–1.2)
MONOCYTES NFR BLD: 6 % (ref 3–10)
NEUTS SEG # BLD: 5.1 K/UL (ref 1.8–8)
NEUTS SEG NFR BLD: 55 % (ref 40–73)
NITRITE UR QL STRIP.AUTO: NEGATIVE
NRBC # BLD: 0 K/UL (ref 0–0.01)
NRBC BLD-RTO: 0 PER 100 WBC
PH UR STRIP: 8.5 (ref 5–8)
PLATELET # BLD AUTO: 252 K/UL (ref 135–420)
PMV BLD AUTO: 9.3 FL (ref 9.2–11.8)
POTASSIUM SERPL-SCNC: 4.8 MMOL/L (ref 3.5–5.5)
PROT SERPL-MCNC: 7.2 G/DL (ref 6.4–8.2)
PROT UR STRIP-MCNC: NEGATIVE MG/DL
RBC # BLD AUTO: 4.04 M/UL (ref 4.2–5.3)
SODIUM SERPL-SCNC: 137 MMOL/L (ref 136–145)
SP GR UR REFRACTOMETRY: 1.01 (ref 1–1.03)
TROPONIN I SERPL HS-MCNC: 49 NG/L (ref 0–54)
UROBILINOGEN UR QL STRIP.AUTO: 0.2 EU/DL (ref 0.2–1)
WBC # BLD AUTO: 9.4 K/UL (ref 4.6–13.2)

## 2024-05-25 PROCEDURE — 6360000004 HC RX CONTRAST MEDICATION: Performed by: STUDENT IN AN ORGANIZED HEALTH CARE EDUCATION/TRAINING PROGRAM

## 2024-05-25 PROCEDURE — 6360000002 HC RX W HCPCS: Performed by: STUDENT IN AN ORGANIZED HEALTH CARE EDUCATION/TRAINING PROGRAM

## 2024-05-25 PROCEDURE — 96375 TX/PRO/DX INJ NEW DRUG ADDON: CPT

## 2024-05-25 PROCEDURE — 93005 ELECTROCARDIOGRAM TRACING: CPT | Performed by: STUDENT IN AN ORGANIZED HEALTH CARE EDUCATION/TRAINING PROGRAM

## 2024-05-25 PROCEDURE — 96376 TX/PRO/DX INJ SAME DRUG ADON: CPT

## 2024-05-25 PROCEDURE — 84484 ASSAY OF TROPONIN QUANT: CPT

## 2024-05-25 PROCEDURE — 81003 URINALYSIS AUTO W/O SCOPE: CPT

## 2024-05-25 PROCEDURE — 96374 THER/PROPH/DIAG INJ IV PUSH: CPT

## 2024-05-25 PROCEDURE — 74174 CTA ABD&PLVS W/CONTRAST: CPT

## 2024-05-25 PROCEDURE — 80053 COMPREHEN METABOLIC PANEL: CPT

## 2024-05-25 PROCEDURE — 83690 ASSAY OF LIPASE: CPT

## 2024-05-25 PROCEDURE — 99285 EMERGENCY DEPT VISIT HI MDM: CPT

## 2024-05-25 PROCEDURE — 2580000003 HC RX 258: Performed by: STUDENT IN AN ORGANIZED HEALTH CARE EDUCATION/TRAINING PROGRAM

## 2024-05-25 PROCEDURE — 93010 ELECTROCARDIOGRAM REPORT: CPT | Performed by: INTERNAL MEDICINE

## 2024-05-25 PROCEDURE — 85025 COMPLETE CBC W/AUTO DIFF WBC: CPT

## 2024-05-25 RX ORDER — ONDANSETRON 4 MG/1
4 TABLET, ORALLY DISINTEGRATING ORAL 3 TIMES DAILY PRN
Qty: 21 TABLET | Refills: 0 | Status: SHIPPED | OUTPATIENT
Start: 2024-05-25

## 2024-05-25 RX ORDER — MORPHINE SULFATE 4 MG/ML
4 INJECTION, SOLUTION INTRAMUSCULAR; INTRAVENOUS
Status: COMPLETED | OUTPATIENT
Start: 2024-05-25 | End: 2024-05-25

## 2024-05-25 RX ORDER — CEPHALEXIN 500 MG/1
500 CAPSULE ORAL 2 TIMES DAILY
Qty: 14 CAPSULE | Refills: 0 | Status: SHIPPED | OUTPATIENT
Start: 2024-05-25 | End: 2024-06-01

## 2024-05-25 RX ORDER — SODIUM CHLORIDE, SODIUM LACTATE, POTASSIUM CHLORIDE, AND CALCIUM CHLORIDE .6; .31; .03; .02 G/100ML; G/100ML; G/100ML; G/100ML
500 INJECTION, SOLUTION INTRAVENOUS ONCE
Status: COMPLETED | OUTPATIENT
Start: 2024-05-25 | End: 2024-05-25

## 2024-05-25 RX ORDER — ONDANSETRON 2 MG/ML
4 INJECTION INTRAMUSCULAR; INTRAVENOUS
Status: COMPLETED | OUTPATIENT
Start: 2024-05-25 | End: 2024-05-25

## 2024-05-25 RX ADMIN — IOPAMIDOL 100 ML: 755 INJECTION, SOLUTION INTRAVENOUS at 05:19

## 2024-05-25 RX ADMIN — MORPHINE SULFATE 4 MG: 4 INJECTION, SOLUTION INTRAMUSCULAR; INTRAVENOUS at 05:36

## 2024-05-25 RX ADMIN — ONDANSETRON 4 MG: 2 INJECTION INTRAMUSCULAR; INTRAVENOUS at 05:36

## 2024-05-25 RX ADMIN — SODIUM CHLORIDE, POTASSIUM CHLORIDE, SODIUM LACTATE AND CALCIUM CHLORIDE 500 ML: 600; 310; 30; 20 INJECTION, SOLUTION INTRAVENOUS at 04:12

## 2024-05-25 RX ADMIN — ONDANSETRON 4 MG: 2 INJECTION INTRAMUSCULAR; INTRAVENOUS at 04:21

## 2024-05-25 ASSESSMENT — LIFESTYLE VARIABLES
HOW OFTEN DO YOU HAVE A DRINK CONTAINING ALCOHOL: NEVER
HOW MANY STANDARD DRINKS CONTAINING ALCOHOL DO YOU HAVE ON A TYPICAL DAY: PATIENT DOES NOT DRINK

## 2024-05-25 ASSESSMENT — PAIN SCALES - GENERAL: PAINLEVEL_OUTOF10: 8

## 2024-05-25 ASSESSMENT — ENCOUNTER SYMPTOMS
SHORTNESS OF BREATH: 0
NAUSEA: 1
ABDOMINAL PAIN: 1
VOMITING: 0

## 2024-05-25 ASSESSMENT — PAIN DESCRIPTION - LOCATION
LOCATION: ABDOMEN
LOCATION: ABDOMEN

## 2024-05-25 ASSESSMENT — PAIN - FUNCTIONAL ASSESSMENT: PAIN_FUNCTIONAL_ASSESSMENT: 0-10

## 2024-05-25 ASSESSMENT — PAIN DESCRIPTION - DESCRIPTORS: DESCRIPTORS: ACHING

## 2024-05-25 ASSESSMENT — PAIN DESCRIPTION - ORIENTATION: ORIENTATION: OTHER (COMMENT)

## 2024-05-25 NOTE — DISCHARGE INSTRUCTIONS
Please keep a log of her blood pressures.  Ideally you would take your blood pressure in the morning, midday as well as the evening.  Please bring this information to your primary care provider as you may benefit from antihypertensive therapy

## 2024-05-25 NOTE — ED PROVIDER NOTES
EMERGENCY DEPARTMENT HISTORY AND PHYSICAL EXAM    4:00 AM      Date: 5/25/2024  Patient Name: Hetal Balderrama    History of Presenting Illness     Chief Complaint   Patient presents with    Abdominal Pain       History From: Patient    Hetal Balderrama is a 83 y.o. female   83-year-old female with history of inguinal hernia as well as gastric ulcer and GERD here for evaluation of abdominal comfort.  Waxing waning abdominal discomfort today.  Also reports associated nausea.  No reported change urination or bowel movements.  No fevers.  No blood in her stool or dark tarry stools.  No chest pain or shortness of breath.  At this time her abdominal pain is mild however has ongoing nausea.  Of note she is hypertensive to 216/92 here.  She denies history of hypertension           Nursing Notes were all reviewed and agreed with or any disagreements were addressed in the HPI.    PCP: Kinjal Richards MD    Current Facility-Administered Medications   Medication Dose Route Frequency Provider Last Rate Last Admin    lactated ringers bolus 500 mL  500 mL IntraVENous Once Marlon Galaviz MD        ondansetron (ZOFRAN) injection 4 mg  4 mg IntraVENous NOW Marlon Galaviz MD         Current Outpatient Medications   Medication Sig Dispense Refill    calcium carbonate (OS-CRISSY) 1250 (500 Ca) MG chewable tablet Take 2 tablets by mouth as needed      vitamin D (CHOLECALCIFEROL) 25 MCG (1000 UT) TABS tablet Take 2,000 Units by mouth daily      magnesium oxide (MAG-OX) 400 MG tablet Take 400 mg by mouth Twice a Week      pantoprazole (PROTONIX) 40 MG tablet Take 40 mg by mouth daily         Past History     Past Medical History:  No past medical history on file.    Past Surgical History:  Past Surgical History:   Procedure Laterality Date    COLONOSCOPY N/A 6/19/2020    COLONOSCOPY/POLYPECTOMY; performed by RENATA Teixeira MD at [facility] ENDOSCOPY    GI         Family History:  No family history on file.    Social History:  Social History

## 2024-05-25 NOTE — ED TRIAGE NOTES
Pt presents to ED with cc of abd pain and nausea since dinner last night. Pt reports last BM was last night.

## (undated) DEVICE — SYRINGE 50ML E/T

## (undated) DEVICE — CANNULA CUSH AD W/ 14FT TBG

## (undated) DEVICE — SPONGE GZ W4XL4IN COT 12 PLY TYP VII WVN C FLD DSGN

## (undated) DEVICE — NDL FLTR TIP 5 MIC 18GX1.5IN --

## (undated) DEVICE — MEDI-VAC NON-CONDUCTIVE SUCTION TUBING: Brand: CARDINAL HEALTH

## (undated) DEVICE — SYR 3ML LL TIP 1/10ML GRAD --

## (undated) DEVICE — GAUZE SPNG AVANT 4X4 4PLY NS --

## (undated) DEVICE — NDL PRT INJ NSAF BLNT 18GX1.5 --

## (undated) DEVICE — SET ADMIN 16ML TBNG L100IN 2 Y INJ SITE IV PIGGY BK DISP

## (undated) DEVICE — SOLUTION IV 500ML 0.9% SOD CHL FLX CONT

## (undated) DEVICE — SINGLE PORT MANIFOLD: Brand: NEPTUNE 2

## (undated) DEVICE — SNARE POLYP SM W13MMXL240CM SHTH DIA2.4MM OVL FLX DISP

## (undated) DEVICE — MAJ-1414 SINGLE USE ADPATER BIOPSY VALV: Brand: SINGLE USE ADAPTOR BIOPSY VALVE

## (undated) DEVICE — TRAP SPEC COLL POLYP POLYSTYR --

## (undated) DEVICE — CATH IV SAFE STR 22GX1IN BLU -- PROTECTIV PLUS

## (undated) DEVICE — WRISTBAND ID AD W2.5XL9.5CM RED VYN ADH CLSR UNI-PRINT

## (undated) DEVICE — ENDO CARRY-ON PROCEDURE KIT INCLUDES ENZYMATIC SPONGE, GAUZE, BIOHAZARD LABEL, TRAY, LUBRICANT, DIRTY SCOPE LABEL, WATER LABEL, TRAY, DRAWSTRING PAD, AND DEFENDO 4-PIECE KIT.: Brand: ENDO CARRY-ON PROCEDURE KIT

## (undated) DEVICE — KENDALL RADIOLUCENT FOAM MONITORING ELECTRODE RECTANGULAR SHAPE: Brand: KENDALL

## (undated) DEVICE — TOURNIQUET PHLEB W1XL18IN BLU FLAT RL AND BND REUSE FOR IV

## (undated) DEVICE — SYR 5ML 1/5 GRAD LL NSAF LF --

## (undated) DEVICE — GARMENT,MEDLINE,DVT,INT,CALF,MED, GEN2: Brand: MEDLINE

## (undated) DEVICE — CATH SUC CTRL PRT TRIFLO 14FR --